# Patient Record
Sex: MALE | Race: WHITE | NOT HISPANIC OR LATINO | Employment: OTHER | URBAN - METROPOLITAN AREA
[De-identification: names, ages, dates, MRNs, and addresses within clinical notes are randomized per-mention and may not be internally consistent; named-entity substitution may affect disease eponyms.]

---

## 2017-10-02 ENCOUNTER — HISTORICAL (OUTPATIENT)
Dept: NUTRITION | Facility: HOSPITAL | Age: 62
End: 2017-10-02

## 2017-12-26 ENCOUNTER — HISTORICAL (OUTPATIENT)
Dept: ADMINISTRATIVE | Facility: HOSPITAL | Age: 62
End: 2017-12-26

## 2018-01-05 ENCOUNTER — HISTORICAL (OUTPATIENT)
Dept: SURGERY | Facility: HOSPITAL | Age: 63
End: 2018-01-05

## 2018-06-19 ENCOUNTER — HISTORICAL (OUTPATIENT)
Dept: LAB | Facility: HOSPITAL | Age: 63
End: 2018-06-19

## 2018-12-11 ENCOUNTER — HISTORICAL (OUTPATIENT)
Dept: LAB | Facility: HOSPITAL | Age: 63
End: 2018-12-11

## 2020-02-07 ENCOUNTER — HISTORICAL (OUTPATIENT)
Dept: RADIOLOGY | Facility: HOSPITAL | Age: 65
End: 2020-02-07

## 2020-03-23 ENCOUNTER — HISTORICAL (OUTPATIENT)
Dept: LAB | Facility: HOSPITAL | Age: 65
End: 2020-03-23

## 2020-10-07 LAB
BUN SERPL-MCNC: 15 MG/DL (ref 7–18)
CALCIUM SERPL-MCNC: 7.9 MG/DL (ref 8.5–10.1)
CREAT SERPL-MCNC: 0.88 MG/DL (ref 0.6–1.3)
GLUCOSE SERPL-MCNC: 100 MG/DL (ref 74–106)
POTASSIUM SERPL-SCNC: 3.5 MMOL/L (ref 3.5–5.1)
SODIUM SERPL-SCNC: 140 MEQ/L (ref 131–145)

## 2021-04-27 ENCOUNTER — HISTORICAL (OUTPATIENT)
Dept: LAB | Facility: HOSPITAL | Age: 66
End: 2021-04-27

## 2021-04-27 LAB
ABS NEUT (OLG): 7.9 X10(3)/MCL (ref 2.1–9.2)
ALBUMIN SERPL-MCNC: 3.9 GM/DL (ref 3.4–4.8)
ALBUMIN/GLOB SERPL: 1.3 RATIO (ref 1.1–2)
ALP SERPL-CCNC: 126 UNIT/L (ref 40–150)
ALT SERPL-CCNC: 31 UNIT/L (ref 0–55)
APPEARANCE, UA: CLEAR
AST SERPL-CCNC: 19 UNIT/L (ref 5–34)
BACTERIA SPEC CULT: ABNORMAL
BASOPHILS # BLD AUTO: 0 X10(3)/MCL (ref 0–0.2)
BASOPHILS NFR BLD AUTO: 0 %
BILIRUB SERPL-MCNC: 0.8 MG/DL
BILIRUB UR QL STRIP: NEGATIVE
BILIRUBIN DIRECT+TOT PNL SERPL-MCNC: 0.3 MG/DL (ref 0–0.5)
BILIRUBIN DIRECT+TOT PNL SERPL-MCNC: 0.5 MG/DL (ref 0–0.8)
BUN SERPL-MCNC: 19.8 MG/DL (ref 8.4–25.7)
CALCIUM SERPL-MCNC: 9 MG/DL (ref 8.8–10)
CHLORIDE SERPL-SCNC: 108 MMOL/L (ref 98–107)
CHOLEST SERPL-MCNC: 126 MG/DL
CHOLEST/HDLC SERPL: 4 {RATIO} (ref 0–5)
CO2 SERPL-SCNC: 24 MMOL/L (ref 23–31)
COLOR UR: YELLOW
CREAT SERPL-MCNC: 1.11 MG/DL (ref 0.73–1.18)
DEPRECATED CALCIDIOL+CALCIFEROL SERPL-MC: 29.3 NG/ML (ref 30–80)
EOSINOPHIL # BLD AUTO: 0.4 X10(3)/MCL (ref 0–0.9)
EOSINOPHIL NFR BLD AUTO: 4 %
ERYTHROCYTE [DISTWIDTH] IN BLOOD BY AUTOMATED COUNT: 13.1 % (ref 11.5–17)
EST. AVERAGE GLUCOSE BLD GHB EST-MCNC: 116.9 MG/DL
GLOBULIN SER-MCNC: 3 GM/DL (ref 2.4–3.5)
GLUCOSE (UA): NEGATIVE
GLUCOSE SERPL-MCNC: 106 MG/DL (ref 82–115)
HBA1C MFR BLD: 5.7 %
HCT VFR BLD AUTO: 46 % (ref 42–52)
HDLC SERPL-MCNC: 36 MG/DL (ref 35–60)
HGB BLD-MCNC: 14.6 GM/DL (ref 14–18)
HGB UR QL STRIP: ABNORMAL
IMM GRANULOCYTES # BLD AUTO: 0.02 % (ref 0–0.02)
IMM GRANULOCYTES NFR BLD AUTO: 0.2 % (ref 0–0.43)
KETONES UR QL STRIP: NEGATIVE
LDLC SERPL CALC-MCNC: 65 MG/DL (ref 50–140)
LEUKOCYTE ESTERASE UR QL STRIP: NEGATIVE
LYMPHOCYTES # BLD AUTO: 1.9 X10(3)/MCL (ref 0.6–4.6)
LYMPHOCYTES NFR BLD AUTO: 17 %
MCH RBC QN AUTO: 29.1 PG (ref 27–31)
MCHC RBC AUTO-ENTMCNC: 31.7 GM/DL (ref 33–36)
MCV RBC AUTO: 91.8 FL (ref 80–94)
MONOCYTES # BLD AUTO: 0.8 X10(3)/MCL (ref 0.1–1.3)
MONOCYTES NFR BLD AUTO: 7 %
NEUTROPHILS # BLD AUTO: 7.9 X10(3)/MCL (ref 1.4–7.9)
NEUTROPHILS NFR BLD AUTO: 71 %
NITRITE UR QL STRIP: NEGATIVE
PH UR STRIP: 7 [PH] (ref 5–9)
PLATELET # BLD AUTO: 269 X10(3)/MCL (ref 130–400)
PMV BLD AUTO: 10.9 FL (ref 9.4–12.4)
POTASSIUM SERPL-SCNC: 4.1 MMOL/L (ref 3.5–5.1)
PROT SERPL-MCNC: 6.9 GM/DL (ref 5.8–7.6)
PROT UR QL STRIP: 30
RBC # BLD AUTO: 5.01 X10(6)/MCL (ref 4.7–6.1)
RBC #/AREA URNS HPF: ABNORMAL /HPF
SODIUM SERPL-SCNC: 143 MMOL/L (ref 136–145)
SP GR UR STRIP: 1.02 (ref 1–1.03)
SQUAMOUS EPITHELIAL, UA: ABNORMAL
TRIGL SERPL-MCNC: 123 MG/DL (ref 34–140)
UROBILINOGEN UR STRIP-ACNC: 0.2
VLDLC SERPL CALC-MCNC: 25 MG/DL
WBC # SPEC AUTO: 11.1 X10(3)/MCL (ref 4.5–11.5)
WBC #/AREA URNS HPF: ABNORMAL /[HPF]

## 2021-07-17 ENCOUNTER — HISTORICAL (OUTPATIENT)
Dept: LAB | Facility: HOSPITAL | Age: 66
End: 2021-07-17

## 2021-07-17 LAB
ABS NEUT (OLG): 4.56 X10(3)/MCL (ref 2.1–9.2)
ALBUMIN SERPL-MCNC: 3.9 GM/DL (ref 3.4–4.8)
ALBUMIN/GLOB SERPL: 1.3 RATIO (ref 1.1–2)
ALP SERPL-CCNC: 136 UNIT/L (ref 40–150)
ALT SERPL-CCNC: 24 UNIT/L (ref 0–55)
APPEARANCE, UA: CLEAR
APTT PPP: 24.5 SECOND(S) (ref 24.5–34.9)
AST SERPL-CCNC: 20 UNIT/L (ref 5–34)
BACTERIA SPEC CULT: NORMAL
BASOPHILS # BLD AUTO: 0 X10(3)/MCL (ref 0–0.2)
BASOPHILS NFR BLD AUTO: 0 %
BILIRUB SERPL-MCNC: 1.4 MG/DL
BILIRUB UR QL STRIP: NEGATIVE
BILIRUBIN DIRECT+TOT PNL SERPL-MCNC: 0.4 MG/DL (ref 0–0.5)
BILIRUBIN DIRECT+TOT PNL SERPL-MCNC: 1 MG/DL (ref 0–0.8)
BUN SERPL-MCNC: 25.9 MG/DL (ref 8.4–25.7)
CALCIUM SERPL-MCNC: 9.1 MG/DL (ref 8.8–10)
CHLORIDE SERPL-SCNC: 106 MMOL/L (ref 98–107)
CO2 SERPL-SCNC: 25 MMOL/L (ref 23–31)
COLOR UR: YELLOW
CREAT SERPL-MCNC: 1.4 MG/DL (ref 0.73–1.18)
EOSINOPHIL # BLD AUTO: 0.3 X10(3)/MCL (ref 0–0.9)
EOSINOPHIL NFR BLD AUTO: 3 %
ERYTHROCYTE [DISTWIDTH] IN BLOOD BY AUTOMATED COUNT: 13 % (ref 11.5–17)
EST. AVERAGE GLUCOSE BLD GHB EST-MCNC: 131.2 MG/DL
GLOBULIN SER-MCNC: 3.1 GM/DL (ref 2.4–3.5)
GLUCOSE (UA): >=1000
GLUCOSE SERPL-MCNC: 130 MG/DL (ref 82–115)
HBA1C MFR BLD: 6.2 %
HCT VFR BLD AUTO: 45.7 % (ref 42–52)
HGB BLD-MCNC: 14.7 GM/DL (ref 14–18)
HGB UR QL STRIP: NORMAL
IMM GRANULOCYTES # BLD AUTO: 0.01 % (ref 0–0.02)
IMM GRANULOCYTES NFR BLD AUTO: 0.1 % (ref 0–0.43)
INR PPP: 0.95 (ref 2–3)
KETONES UR QL STRIP: NEGATIVE
LEUKOCYTE ESTERASE UR QL STRIP: NEGATIVE
LYMPHOCYTES # BLD AUTO: 2.2 X10(3)/MCL (ref 0.6–4.6)
LYMPHOCYTES NFR BLD AUTO: 28 %
MCH RBC QN AUTO: 29.3 PG (ref 27–31)
MCHC RBC AUTO-ENTMCNC: 32.2 GM/DL (ref 33–36)
MCV RBC AUTO: 91 FL (ref 80–94)
MONOCYTES # BLD AUTO: 0.7 X10(3)/MCL (ref 0.1–1.3)
MONOCYTES NFR BLD AUTO: 9 %
NEUTROPHILS # BLD AUTO: 4.56 X10(3)/MCL (ref 1.4–7.9)
NEUTROPHILS NFR BLD AUTO: 59 %
NITRITE UR QL STRIP: NEGATIVE
PH UR STRIP: 6 [PH] (ref 5–9)
PLATELET # BLD AUTO: 240 X10(3)/MCL (ref 130–400)
PMV BLD AUTO: 11.3 FL (ref 9.4–12.4)
POTASSIUM SERPL-SCNC: 4.1 MMOL/L (ref 3.5–5.1)
PROT SERPL-MCNC: 7 GM/DL (ref 5.8–7.6)
PROT UR QL STRIP: 30
PROTHROMBIN TIME: 10 SECOND(S) (ref 9.3–11.4)
RBC # BLD AUTO: 5.02 X10(6)/MCL (ref 4.7–6.1)
RBC #/AREA URNS HPF: NORMAL /[HPF]
SODIUM SERPL-SCNC: 139 MMOL/L (ref 136–145)
SP GR UR STRIP: 1.02 (ref 1–1.03)
SQUAMOUS EPITHELIAL, UA: NORMAL
UROBILINOGEN UR STRIP-ACNC: 0.2
WBC # SPEC AUTO: 7.8 X10(3)/MCL (ref 4.5–11.5)
WBC #/AREA URNS HPF: NORMAL /HPF

## 2021-08-03 ENCOUNTER — HISTORICAL (OUTPATIENT)
Dept: LAB | Facility: HOSPITAL | Age: 66
End: 2021-08-03

## 2021-08-03 LAB — SARS-COV-2 AG RESP QL IA.RAPID: NEGATIVE

## 2022-01-04 ENCOUNTER — HISTORICAL (OUTPATIENT)
Dept: LAB | Facility: HOSPITAL | Age: 67
End: 2022-01-04

## 2022-01-25 ENCOUNTER — HISTORICAL (OUTPATIENT)
Dept: LAB | Facility: HOSPITAL | Age: 67
End: 2022-01-25

## 2022-04-09 ENCOUNTER — HISTORICAL (OUTPATIENT)
Dept: ADMINISTRATIVE | Facility: HOSPITAL | Age: 67
End: 2022-04-09
Payer: MEDICARE

## 2022-04-26 VITALS
DIASTOLIC BLOOD PRESSURE: 68 MMHG | HEIGHT: 66 IN | OXYGEN SATURATION: 97 % | SYSTOLIC BLOOD PRESSURE: 150 MMHG | WEIGHT: 218.06 LBS | BODY MASS INDEX: 35.04 KG/M2

## 2022-05-02 NOTE — HISTORICAL [facility] CERNER
This is a historical note converted from Jess. Formatting and pictures may have been removed.  Please reference Jess for original formatting and attached multimedia. History of Present Illness  61yM with a few month hx of left sided throat pain and otalgia. He has no smoking or EtOH hx  Denies lumps and bumps  He does report facial pressure and nasal congestion  Reports occasional heartburn  He takes no medicine  ?   Also has a right nasolabial skin cancer that will be treated by his dermatologist (Dr Laura in [place])  ?   8/4/16: some improvement in throat pain. still with left otalgia - which is described more as pressure than pain. States he has had chronic pressure sensation and intermittent worsening in the areas of left cheek and forehead. This has been going on for years but has never been treated with antibiotics or steroids. He has monthly flare-ups of the pressure/pain. States his left nostril stays stopped up all the time. He sleeps with mouth open and makes his throat very dry. States his wife tells him he holds his breath during sleep.  ?   8/23/16: here for f/u after CT.  ?   9/20/16:? Breathing much better and very pleased with results.? Reports moderate mucoid nasal discharge.? Skin lesion path BCCa - margins uninvolved.  ?   9/27/16: Breathing better. Smelling better. Does note decreased hearing in the left ear.  ?   10/11/16: Here for results of audio. Hearing stable. States he has intermittent left sided tinnitus since last surgery. denies vertigo. 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 +sun exposure. had another skin cancer removed from chest yesterday  nasal lesion healing. patient still with persistent nasal discharge, using saline irrigations TID.  Requesting sleep study. +snoring; no apneic episodes. +daytime sleepiness;forgetfullness  ?   12/15/16: Pt here in f/u. Had titration study 2 days ago. states he was not able to tolerate bipap or cpap due to need for extreme high  pressure. there is no report in the system as of yet. He is not on flonase or any nasal sprays.  He feels he is breathiing better since the surgery.  He was told during the titration study that something may be blocking his throat  ?  8/22/17: 61 y/o M w/ h/o multiple BCC including R nasal sidewall BCC s/p excision and local advancement flap (and concurrent s/p septo/turbs) in the OR by us in 9/12/2016. Has also had evaluation for COREY (couldnt prev tolerate CPAP titration), however doing well tolerating nasal CPAP now.?Has been lost to f/u with ENT for 6 months, however has been seeing Dermatology.  - Has new BCC of nasal dorsum per patient and Dermatologist (described as 7 mm in [facility] note on 1/12/17, and every report since mentions a nasal?BCC,?however I believe its never been biopsied as there no pathology report in our EMR describing this second nasal BCC, and patient does not recall it being biopsied). Patient reports that sometime last year he had an ulcer there that eventually resolved after a matter of ?months (patient not sure). Denies pain or change in the lesion.  - Had pain and nasal obstruction for a few months after septo turbs but once that resolved he reports resolution of obstruction and is very satisfied with the results  ?  8/29/17: Here for f/u. Biopsy site on nose healing well. Has had some dysphagia to certain solids, not liquids, feels like food gets stuck a bit but then goes down. Has had some coughing and choking. Cont to take omeprazole daily.  ?  12/12/17: Referred from  clinic for nasal BCCa. Bx on 11/7/17. Lesion present for 1 yr, occasional bleeding, different than one we biopsied in 8/2017. Long h/o sun exposure and previous BCCa (one removed by ENT in 2016). Diabetes diagnosis 4 months ago, A1c 10->7 with diet/exercise  ?  1/5/18: Here today for excision of right nasal BCCa and right post auricular cyst. No changes in medical history.  Review of Systems  Constitutional -  Denies  Eye - Denies  HENT - See HPI  Respiratory - Denies  Cardiovascular - Denies  Gastrointestinal - Denies  Genitourinary - Denies  Heme/Lymph ?- Denies  Endocrine ?- Denies  Immunologic ?- Denies  Musculoskeletal ?- Denies  Integumentary ?- Denies  Neurologic ?- Denies  All Other ROS negative  Physical Exam  AOx3, NAD  PERRL, EOMI  CN II-XII intact  Many areas of scalp erythema concerning for AK (solar changes), no mass or discrete lesion concerning for cancer  B EACs wnl. B TMs intact without effusion or retraction. R post auricular 1 cm subcutaneous nodule c/w sebaceous cyst  R nasal sidewall?with slightly raised area from previous WLE/adj tissue transfer  2x2mm indented hypopigmented area on right nasal tip,?previous biopsy site  Also small?5x6 mm area of hypopigmented and raised skin with central depression nasal supratip, at recent biopsy site that was + for BCCA  Septum midline +ITH  Fair dentition  No oral cavity/oropharynx lesions, tonsils absent/wnl  Neck soft; no LAD  No increased WOB [1]  Assessment/Plan  Ordered:  ceFAZolin, 2 gm, form: Infusion, IV Piggyback, On Call, Infuse over: 1 hr, Order duration: 1 dose(s), first dose 01/05/18 5:04:00 CST, 28,059  61yoM h/o SNHL s/p septo/turbs/R nasal sidewall BCC excision with local advancement flap on 9/12/17, now w/ new supratip BCCa  - Discussed options of clinic vs OR resection as well as reconstruction options including healing by secondary intention, adj tissue transfer, skin graft and paramedian. Pt decided that he would prefer all in one treatment. Will plan for WLE with local flap recon today in OR  Discussed possibility of complication from discordance between frozen and permanent pathology results, pt acknowledged. Consent signed today.  - Pt also with R postauricular likely sebaceous cyst, will excise today  ?   Problem List/Past Medical History  Ongoing  Acute lower UTI (urinary tract infection)  Basal cell carcinoma of skin of nose  BPH -  Benign prostatic hypertrophy  Cataract  CPAP treatment  Hypertension  Insomnia  Obesity  COREY - Obstructive sleep apnea  Historical  Procedure/Surgical History  basil cell removal chest wall (07/2017)  Monitoring of Sleep, External Approach (12/13/2016)  Polysomnography; age 6 years or older, sleep staging with 4 or more additional parameters of sleep, with initiation of continuous positive airway pressure therapy or bilevel ventilation, attended by a technologist (12/13/2016)  Adjacent tissue transfer or rearrangement, forehead, cheeks, chin, mouth, neck, axillae, genitalia, hands and/or feet; defect 10 sq cm or less (09/12/2016)  Excision Lesion Nose (None) (09/12/2016)  Excision of Face Skin, External Approach, Diagnostic (09/12/2016)  Excision, malignant lesion including margins, face, ears, eyelids, nose, lips; excised diameter 2.1 to 3.0 cm (09/12/2016)  Repair Nasal Septum, Percutaneous Endoscopic Approach (09/12/2016)  Septoplasty (None) (09/12/2016)  Septoplasty or submucous resection, with or without cartilage scoring, contouring or replacement with graft (09/12/2016)  Submucous resection inferior turbinate, partial or complete, any method (09/12/2016)  Transfer Face Skin, External Approach (09/12/2016)  Turbinate Reduction (None) (09/12/2016)  elbow sx  neck and back sx  shoulder sx  Medications  Inpatient  Ancef, 2 gm= 100 mL, IV Piggyback, On Call  Dextrose 50% and Water (50 mL vial/syringe), 12.5 gm= 25 mL, IV, As Directed, PRN  insulin lispro 100 units/mL subcutaneous injection, 2-9 units, Subcutaneous, As Directed, PRN  IVF D5 Lactated Ringers LR Infusion 1,000 mL, 1000 mL, IV  Home  amLODIPine 10 mg oral tablet, 10 mg= 1 tab(s), Oral, Daily, 3 refills  Flomax 0.4 mg oral capsule, 0.8 mg= 2 cap(s), Oral, Daily, 5 refills  Flonase 50 mcg/inh nasal spray, 1 spray(s), Nasal, Daily, 10 refills  Glucometer, See Instructions  Glucometer Test Strips, See Instructions, 3 refills  hydroCHLOROthiazide-losartan  25 mg-100 mg oral tablet, 1 tab(s), Oral, Daily, 3 refills  Jardiance 10 mg oral tablet, 10 mg= 1 tab(s), Oral, Once,? ?Not taking  Lancets, See Instructions, 3 refills  metformin 500 mg oral tablet, See Instructions, 5 refills  metformin 500 mg oral tablet, 500 mg= 1 tab(s), Oral, BID  omeprazole 20 mg oral DR capsule, 20 mg= 1 cap(s), Oral, Daily, 6 refills  Prilosec 20 mg oral DR capsule, 20 mg= 1 cap(s), Oral, Daily, 5 refills  terazosin 5 mg oral capsule, 5 mg= 1 cap(s), Oral, Once a day (at bedtime), 6 refills  Ultracet 37.5 mg-325 mg oral tablet, 1 tab(s), Oral, BID, PRN  Allergies  No Known Allergies  Social History  Alcohol - 12/26/2017  Current, Beer, 1-2 times per month  Employment/School - 12/26/2017  Unemployed, Highest education level: Some college.  Home/Environment - 12/26/2017  Lives with Significant other. Living situation: Home/Independent.  Substance Abuse - 12/26/2017  Never  Tobacco - 12/26/2017  Never smoker  Family History  Alzheimers disease: Father.  Lab Results  Final Diagnosis?(Verified)  ?  1. ?Skin lesion, right posterior shoulder, Punch biopsy:  - Mild actinic keratosis with severe solar elastosis (sun damage).  ?  2. ?Skin lesion, right nasal, Punch biopsy:  - Basal cell carcinoma.  ?  Signature Line  *Electronically Signed*  ?? Karina Aaron MD  Verified: 11/10/17 15:20  [2]     [1]?ENT Clinic Note; Paul Mazariegos MD 12/12/2017 12:27 CST  [2]?ENT Clinic Note; Paul Mazariegos MD 12/12/2017 12:27 CST

## 2022-05-02 NOTE — HISTORICAL [facility] CERNER
This is a historical note converted from [employer]. Formatting and pictures may have been removed.  Please reference [employer] for original formatting and attached multimedia. Indication for Surgery  Right?nasal basal cell Ca  Right postauricular cyst  Preoperative Diagnosis  Right?nasal basal cell Ca  Right postauricular cyst  Postoperative Diagnosis  Right?nasal basal cell Ca  Right postauricular cyst  Operation  WLE of right nasal BCCa with bilobe flap reconstruction  Excision of right post auricular cyst  Surgeon(s)  Joao Clements MD  Assistant  Luzma Parsons MD  Anesthesia  GETA  Estimated Blood Loss  20 cc  Findings  Right nasal supratip BCCa  Right post auricular cyst  Specimen(s)  Right nasal supratip lesion - positive margins  Right nasal supratip lesion 12-3 margin - frozen - negative  Right nasal supratip lesion 3-6 margin - frozen - negative  Right nasal supratip lesion 6-9 margin - frozen - negative  Right nasal supratip lesion 9-12 margin - frozen - negative  Right nasal supratip lesion Deep margin - positive  Right nasal supratip lesion 6-9 margin #2 - frozen - negative  Right nasal supratip lesion Deep margin - permanent  Right nasal supratip lesion deep margin - frozen - negative  Right postauricular cyst - permanent  Complications  None  Technique  After informed consent was obtained and all questions were answered, patient was brought to the operating room and placed on the operating room table in supine position. The patient was intubated by the anesthesia team without difficulty. The bed was then turned 90 degrees. The nasal lesion margins were marked and a proposed incision for the right postauricular lesion was marked. 5 cc of 2% lidocaine with epi was used to anesthetize the areas. The patient had a corneal shield placed in the right eye and he was prepped with baby shampoo. Patient was then draped in the usual sterile fashion. The marked lesion was excised using a 15 blade?and sent for frozen  evaluation. The deep and peripheral margins were involved so additional quadrant margins as well as a deep margin was sent. While waiting for pathology, attention was turned to the right postauricular mass. A 15 blade was used to incise an approximately 1.5 cm incision. Curved iris scissors were used to dissect the cyst circumfrentially. The cyst cavity was entered and keratinaceous material was expressed. The remainder of the cyst was removed in its entirety. The wound was then irrigated and hemostasis was obtained with bipolar cautery. The wound was closed with 4.0 vicryl in the deep layer and the skin was closed with running 5.0 fast suture.  Pathology had returned and an additional deep margin was taken - the first sent for permanent, the second additional deep margin was sent for frozen and this was free of carcinoma.  The planned bilobe flap was drawn using the final defect which measured 8 mm in diameter. The 15 blade was used to make the skin incisions and curved iris scissors were used to undermine the tissue widely. Hemostasis was obtained using bipolar cautery. The flap was then rotated and tacted in place using 4.0 PDS suture. The triangle portion of the flap needed to be trimmed to fit and a standing triangle deformity was removed from the most inferior aspect. The skin was reapproximated using 6.0 nylon suture in simple interrupted fashion.  The patient was then extubated without difficulty and moved to recovery in stable condition. All counts were corrected x 2 at the end of the case and Dr. Smallwood was available for all key portions.  ?

## 2022-07-14 PROBLEM — L57.0 ACTINIC KERATOSIS: Status: ACTIVE | Noted: 2022-07-14

## 2022-07-14 PROBLEM — R14.0 ABDOMINAL BLOATING: Status: ACTIVE | Noted: 2022-07-14

## 2022-07-14 PROBLEM — G47.33 OBSTRUCTIVE SLEEP APNEA SYNDROME: Status: ACTIVE | Noted: 2022-07-14

## 2022-07-14 PROBLEM — R06.09 DYSPNEA ON EXERTION: Status: ACTIVE | Noted: 2022-07-14

## 2022-07-14 PROBLEM — E66.9 OBESITY: Status: ACTIVE | Noted: 2022-07-14

## 2022-07-14 PROBLEM — E11.9 DIABETES MELLITUS: Status: ACTIVE | Noted: 2022-07-14

## 2022-07-14 PROBLEM — R19.7 DIARRHEA: Status: ACTIVE | Noted: 2022-07-14

## 2022-07-14 PROBLEM — M54.12 CERVICAL RADICULOPATHY: Status: ACTIVE | Noted: 2022-07-14

## 2022-07-14 PROBLEM — I10 HYPERTENSION: Status: ACTIVE | Noted: 2022-07-14

## 2022-07-14 PROBLEM — C44.311 BASAL CELL CARCINOMA (BCC) OF SKIN OF NOSE: Status: ACTIVE | Noted: 2022-07-14

## 2022-07-14 PROBLEM — N40.0 BENIGN PROSTATIC HYPERPLASIA: Status: ACTIVE | Noted: 2022-07-14

## 2022-07-14 PROBLEM — Z00.00 MEDICARE ANNUAL WELLNESS VISIT, SUBSEQUENT: Status: ACTIVE | Noted: 2022-07-14

## 2022-07-14 PROBLEM — G47.00 INSOMNIA: Status: ACTIVE | Noted: 2022-07-14

## 2022-07-14 PROBLEM — M25.559 ARTHRALGIA OF HIP: Status: ACTIVE | Noted: 2022-07-14

## 2022-07-17 PROBLEM — J31.0 CHRONIC RHINITIS: Status: ACTIVE | Noted: 2022-07-17

## 2022-07-17 PROBLEM — E78.2 MIXED HYPERLIPIDEMIA: Status: ACTIVE | Noted: 2022-07-17

## 2022-07-17 PROBLEM — K21.9 GASTROESOPHAGEAL REFLUX DISEASE WITHOUT ESOPHAGITIS: Status: ACTIVE | Noted: 2022-07-17

## 2022-07-26 ENCOUNTER — LAB VISIT (OUTPATIENT)
Dept: LAB | Facility: HOSPITAL | Age: 67
End: 2022-07-26
Attending: FAMILY MEDICINE
Payer: MEDICARE

## 2022-07-26 DIAGNOSIS — I10 PRIMARY HYPERTENSION: ICD-10-CM

## 2022-07-26 DIAGNOSIS — E78.2 MIXED HYPERLIPIDEMIA: ICD-10-CM

## 2022-07-26 DIAGNOSIS — E53.8 VITAMIN B12 DEFICIENCY: ICD-10-CM

## 2022-07-26 DIAGNOSIS — E11.65 TYPE 2 DIABETES MELLITUS WITH HYPERGLYCEMIA, WITHOUT LONG-TERM CURRENT USE OF INSULIN: ICD-10-CM

## 2022-07-26 DIAGNOSIS — E55.9 VITAMIN D DEFICIENCY: ICD-10-CM

## 2022-07-26 LAB
ALBUMIN SERPL-MCNC: 3.6 GM/DL (ref 3.4–4.8)
ALBUMIN/GLOB SERPL: 1 RATIO (ref 1.1–2)
ALP SERPL-CCNC: 164 UNIT/L (ref 40–150)
ALT SERPL-CCNC: 39 UNIT/L (ref 0–55)
AST SERPL-CCNC: 23 UNIT/L (ref 5–34)
BASOPHILS # BLD AUTO: 0.05 X10(3)/MCL (ref 0–0.2)
BASOPHILS NFR BLD AUTO: 0.5 %
BILIRUBIN DIRECT+TOT PNL SERPL-MCNC: 0.8 MG/DL
BUN SERPL-MCNC: 21.6 MG/DL (ref 8.4–25.7)
CALCIUM SERPL-MCNC: 9.4 MG/DL (ref 8.8–10)
CHLORIDE SERPL-SCNC: 111 MMOL/L (ref 98–107)
CHOLEST SERPL-MCNC: 123 MG/DL
CHOLEST/HDLC SERPL: 3 {RATIO} (ref 0–5)
CO2 SERPL-SCNC: 20 MMOL/L (ref 23–31)
CREAT SERPL-MCNC: 1.56 MG/DL (ref 0.73–1.18)
DEPRECATED CALCIDIOL+CALCIFEROL SERPL-MC: 26.3 NG/ML (ref 30–80)
EOSINOPHIL # BLD AUTO: 0.31 X10(3)/MCL (ref 0–0.9)
EOSINOPHIL NFR BLD AUTO: 3.3 %
ERYTHROCYTE [DISTWIDTH] IN BLOOD BY AUTOMATED COUNT: 14.9 % (ref 11.5–17)
EST. AVERAGE GLUCOSE BLD GHB EST-MCNC: 148.5 MG/DL
GLOBULIN SER-MCNC: 3.6 GM/DL (ref 2.4–3.5)
GLUCOSE SERPL-MCNC: 151 MG/DL (ref 82–115)
HBA1C MFR BLD: 6.8 %
HCT VFR BLD AUTO: 47.8 % (ref 42–52)
HDLC SERPL-MCNC: 37 MG/DL (ref 35–60)
HGB BLD-MCNC: 14.8 GM/DL (ref 14–18)
IMM GRANULOCYTES # BLD AUTO: 0.01 X10(3)/MCL (ref 0–0.04)
IMM GRANULOCYTES NFR BLD AUTO: 0.1 %
LDLC SERPL CALC-MCNC: 57 MG/DL (ref 50–140)
LYMPHOCYTES # BLD AUTO: 2.23 X10(3)/MCL (ref 0.6–4.6)
LYMPHOCYTES NFR BLD AUTO: 23.8 %
MCH RBC QN AUTO: 27.5 PG (ref 27–31)
MCHC RBC AUTO-ENTMCNC: 31 MG/DL (ref 33–36)
MCV RBC AUTO: 88.7 FL (ref 80–94)
MONOCYTES # BLD AUTO: 0.75 X10(3)/MCL (ref 0.1–1.3)
MONOCYTES NFR BLD AUTO: 8 %
NEUTROPHILS # BLD AUTO: 6 X10(3)/MCL (ref 2.1–9.2)
NEUTROPHILS NFR BLD AUTO: 64.3 %
PLATELET # BLD AUTO: 249 X10(3)/MCL (ref 130–400)
PMV BLD AUTO: 11 FL (ref 7.4–10.4)
POTASSIUM SERPL-SCNC: 4 MMOL/L (ref 3.5–5.1)
PROT SERPL-MCNC: 7.2 GM/DL (ref 5.8–7.6)
RBC # BLD AUTO: 5.39 X10(6)/MCL (ref 4.7–6.1)
SODIUM SERPL-SCNC: 140 MMOL/L (ref 136–145)
TRIGL SERPL-MCNC: 144 MG/DL (ref 34–140)
VIT B12 SERPL-MCNC: 271 PG/ML (ref 213–816)
VLDLC SERPL CALC-MCNC: 29 MG/DL
WBC # SPEC AUTO: 9.4 X10(3)/MCL (ref 4.5–11.5)

## 2022-07-26 PROCEDURE — 83036 HEMOGLOBIN GLYCOSYLATED A1C: CPT

## 2022-07-26 PROCEDURE — 82306 VITAMIN D 25 HYDROXY: CPT

## 2022-07-26 PROCEDURE — 82607 VITAMIN B-12: CPT

## 2022-07-26 PROCEDURE — 80061 LIPID PANEL: CPT

## 2022-07-26 PROCEDURE — 85025 COMPLETE CBC W/AUTO DIFF WBC: CPT

## 2022-07-26 PROCEDURE — 36415 COLL VENOUS BLD VENIPUNCTURE: CPT

## 2022-07-26 PROCEDURE — 80053 COMPREHEN METABOLIC PANEL: CPT

## 2022-09-15 ENCOUNTER — HISTORICAL (OUTPATIENT)
Dept: ADMINISTRATIVE | Facility: HOSPITAL | Age: 67
End: 2022-09-15
Payer: MEDICARE

## 2022-10-17 PROBLEM — Z00.00 MEDICARE ANNUAL WELLNESS VISIT, SUBSEQUENT: Status: RESOLVED | Noted: 2022-07-14 | Resolved: 2022-10-17

## 2022-11-13 ENCOUNTER — HOSPITAL ENCOUNTER (EMERGENCY)
Facility: HOSPITAL | Age: 67
Discharge: ANOTHER HOSPITAL | End: 2022-11-14
Attending: SPECIALIST
Payer: MEDICARE

## 2022-11-13 DIAGNOSIS — K56.609 SMALL BOWEL OBSTRUCTION: Primary | ICD-10-CM

## 2022-11-13 DIAGNOSIS — R73.9 ELEVATED BLOOD SUGAR: ICD-10-CM

## 2022-11-13 DIAGNOSIS — R10.9 ABDOMINAL PAIN: ICD-10-CM

## 2022-11-13 DIAGNOSIS — R03.0 ELEVATED BLOOD PRESSURE READING: ICD-10-CM

## 2022-11-13 LAB
ALBUMIN SERPL-MCNC: 4.5 GM/DL (ref 3.4–4.8)
ALBUMIN/GLOB SERPL: 1.1 RATIO (ref 1.1–2)
ALP SERPL-CCNC: 177 UNIT/L (ref 40–150)
ALT SERPL-CCNC: 28 UNIT/L (ref 0–55)
APPEARANCE UR: CLEAR
AST SERPL-CCNC: 28 UNIT/L (ref 5–34)
BACTERIA #/AREA URNS AUTO: ABNORMAL /HPF
BASOPHILS # BLD AUTO: 0.01 X10(3)/MCL (ref 0–0.2)
BASOPHILS NFR BLD AUTO: 0 %
BILIRUB UR QL STRIP.AUTO: NEGATIVE MG/DL
BILIRUBIN DIRECT+TOT PNL SERPL-MCNC: 2 MG/DL
BUN SERPL-MCNC: 17.7 MG/DL (ref 8.4–25.7)
CALCIUM SERPL-MCNC: 10.3 MG/DL (ref 8.8–10)
CHLORIDE SERPL-SCNC: 99 MMOL/L (ref 98–107)
CO2 SERPL-SCNC: 24 MMOL/L (ref 23–31)
COLOR UR AUTO: YELLOW
CREAT SERPL-MCNC: 1.31 MG/DL (ref 0.73–1.18)
EOSINOPHIL # BLD AUTO: 0.01 X10(3)/MCL (ref 0–0.9)
EOSINOPHIL NFR BLD AUTO: 0 %
ERYTHROCYTE [DISTWIDTH] IN BLOOD BY AUTOMATED COUNT: 12.8 % (ref 11.5–17)
GFR SERPLBLD CREATININE-BSD FMLA CKD-EPI: 60 MLS/MIN/1.73/M2
GLOBULIN SER-MCNC: 4 GM/DL (ref 2.4–3.5)
GLUCOSE SERPL-MCNC: 307 MG/DL (ref 82–115)
GLUCOSE UR QL STRIP.AUTO: >=1000 MG/DL
HCT VFR BLD AUTO: 54.6 % (ref 42–52)
HGB BLD-MCNC: 17.3 GM/DL (ref 14–18)
IMM GRANULOCYTES # BLD AUTO: 0.04 X10(3)/MCL (ref 0–0.04)
IMM GRANULOCYTES NFR BLD AUTO: 0.2 %
KETONES UR QL STRIP.AUTO: 80 MG/DL
LEUKOCYTE ESTERASE UR QL STRIP.AUTO: NEGATIVE UNIT/L
LIPASE SERPL-CCNC: 16 U/L
LYMPHOCYTES # BLD AUTO: 0.83 X10(3)/MCL (ref 0.6–4.6)
LYMPHOCYTES NFR BLD AUTO: 4.1 %
MCH RBC QN AUTO: 28 PG (ref 27–31)
MCHC RBC AUTO-ENTMCNC: 31.7 MG/DL (ref 33–36)
MCV RBC AUTO: 88.3 FL (ref 80–94)
MONOCYTES # BLD AUTO: 0.56 X10(3)/MCL (ref 0.1–1.3)
MONOCYTES NFR BLD AUTO: 2.8 %
NEUTROPHILS # BLD AUTO: 18.6 X10(3)/MCL (ref 2.1–9.2)
NEUTROPHILS NFR BLD AUTO: 92.9 %
NITRITE UR QL STRIP.AUTO: NEGATIVE
PH UR STRIP.AUTO: 7 [PH]
PLATELET # BLD AUTO: 237 X10(3)/MCL (ref 130–400)
PMV BLD AUTO: 12.1 FL (ref 7.4–10.4)
POTASSIUM SERPL-SCNC: 3.7 MMOL/L (ref 3.5–5.1)
PROT SERPL-MCNC: 8.5 GM/DL (ref 5.8–7.6)
PROT UR QL STRIP.AUTO: >=300 MG/DL
RBC # BLD AUTO: 6.18 X10(6)/MCL (ref 4.7–6.1)
RBC #/AREA URNS AUTO: ABNORMAL /HPF
RBC UR QL AUTO: ABNORMAL UNIT/L
SODIUM SERPL-SCNC: 140 MMOL/L (ref 136–145)
SP GR UR STRIP.AUTO: 1.02
SQUAMOUS #/AREA URNS AUTO: ABNORMAL /HPF
UROBILINOGEN UR STRIP-ACNC: 1 MG/DL
WBC # SPEC AUTO: 20.1 X10(3)/MCL (ref 4.5–11.5)
WBC #/AREA URNS AUTO: ABNORMAL /HPF

## 2022-11-13 PROCEDURE — 96376 TX/PRO/DX INJ SAME DRUG ADON: CPT

## 2022-11-13 PROCEDURE — 80053 COMPREHEN METABOLIC PANEL: CPT | Performed by: SPECIALIST

## 2022-11-13 PROCEDURE — 63600175 PHARM REV CODE 636 W HCPCS: Performed by: SPECIALIST

## 2022-11-13 PROCEDURE — 96375 TX/PRO/DX INJ NEW DRUG ADDON: CPT

## 2022-11-13 PROCEDURE — 82962 GLUCOSE BLOOD TEST: CPT

## 2022-11-13 PROCEDURE — 96374 THER/PROPH/DIAG INJ IV PUSH: CPT | Mod: 59

## 2022-11-13 PROCEDURE — 85025 COMPLETE CBC W/AUTO DIFF WBC: CPT | Performed by: SPECIALIST

## 2022-11-13 PROCEDURE — 96361 HYDRATE IV INFUSION ADD-ON: CPT

## 2022-11-13 PROCEDURE — 25000003 PHARM REV CODE 250: Performed by: SPECIALIST

## 2022-11-13 PROCEDURE — 81001 URINALYSIS AUTO W/SCOPE: CPT | Performed by: SPECIALIST

## 2022-11-13 PROCEDURE — 81003 URINALYSIS AUTO W/O SCOPE: CPT | Performed by: SPECIALIST

## 2022-11-13 PROCEDURE — 83690 ASSAY OF LIPASE: CPT | Performed by: SPECIALIST

## 2022-11-13 PROCEDURE — 99285 EMERGENCY DEPT VISIT HI MDM: CPT | Mod: 25

## 2022-11-13 RX ORDER — MORPHINE SULFATE 4 MG/ML
4 INJECTION, SOLUTION INTRAMUSCULAR; INTRAVENOUS
Status: COMPLETED | OUTPATIENT
Start: 2022-11-13 | End: 2022-11-13

## 2022-11-13 RX ORDER — ONDANSETRON 2 MG/ML
4 INJECTION INTRAMUSCULAR; INTRAVENOUS
Status: COMPLETED | OUTPATIENT
Start: 2022-11-13 | End: 2022-11-13

## 2022-11-13 RX ADMIN — MORPHINE SULFATE 4 MG: 4 INJECTION, SOLUTION INTRAMUSCULAR; INTRAVENOUS at 10:11

## 2022-11-13 RX ADMIN — SODIUM CHLORIDE 500 ML: 9 INJECTION, SOLUTION INTRAVENOUS at 10:11

## 2022-11-13 RX ADMIN — IOPAMIDOL 100 ML: 755 INJECTION, SOLUTION INTRAVENOUS at 11:11

## 2022-11-13 RX ADMIN — ONDANSETRON 4 MG: 2 INJECTION INTRAMUSCULAR; INTRAVENOUS at 10:11

## 2022-11-14 ENCOUNTER — ANESTHESIA EVENT (OUTPATIENT)
Dept: MEDSURG UNIT | Facility: HOSPITAL | Age: 67
End: 2022-11-14

## 2022-11-14 ENCOUNTER — ANESTHESIA (OUTPATIENT)
Dept: MEDSURG UNIT | Facility: HOSPITAL | Age: 67
End: 2022-11-14

## 2022-11-14 ENCOUNTER — HOSPITAL ENCOUNTER (INPATIENT)
Facility: HOSPITAL | Age: 67
LOS: 2 days | Discharge: HOME | DRG: 390 | End: 2022-11-16
Attending: INTERNAL MEDICINE | Admitting: INTERNAL MEDICINE
Payer: MEDICARE

## 2022-11-14 VITALS
OXYGEN SATURATION: 96 % | RESPIRATION RATE: 20 BRPM | HEART RATE: 93 BPM | HEIGHT: 65 IN | DIASTOLIC BLOOD PRESSURE: 96 MMHG | BODY MASS INDEX: 33.32 KG/M2 | TEMPERATURE: 98 F | WEIGHT: 200 LBS | SYSTOLIC BLOOD PRESSURE: 172 MMHG

## 2022-11-14 DIAGNOSIS — R07.9 CHEST PAIN: ICD-10-CM

## 2022-11-14 DIAGNOSIS — R10.84 GENERALIZED ABDOMINAL PAIN: ICD-10-CM

## 2022-11-14 DIAGNOSIS — K56.609 SMALL BOWEL OBSTRUCTION: Primary | ICD-10-CM

## 2022-11-14 LAB
ALBUMIN SERPL-MCNC: 4 GM/DL (ref 3.4–4.8)
ALBUMIN/GLOB SERPL: 1.1 RATIO (ref 1.1–2)
ALP SERPL-CCNC: 158 UNIT/L (ref 40–150)
ALT SERPL-CCNC: 25 UNIT/L (ref 0–55)
AST SERPL-CCNC: 21 UNIT/L (ref 5–34)
BASOPHILS # BLD AUTO: 0.03 X10(3)/MCL (ref 0–0.2)
BASOPHILS NFR BLD AUTO: 0.2 %
BILIRUBIN DIRECT+TOT PNL SERPL-MCNC: 2.1 MG/DL
BUN SERPL-MCNC: 17 MG/DL (ref 8.4–25.7)
CALCIUM SERPL-MCNC: 9.1 MG/DL (ref 8.8–10)
CHLORIDE SERPL-SCNC: 101 MMOL/L (ref 98–107)
CO2 SERPL-SCNC: 26 MMOL/L (ref 23–31)
CREAT SERPL-MCNC: 1.23 MG/DL (ref 0.73–1.18)
EOSINOPHIL # BLD AUTO: 0 X10(3)/MCL (ref 0–0.9)
EOSINOPHIL NFR BLD AUTO: 0 %
ERYTHROCYTE [DISTWIDTH] IN BLOOD BY AUTOMATED COUNT: 13.2 % (ref 11.5–17)
EST. AVERAGE GLUCOSE BLD GHB EST-MCNC: 148.5 MG/DL
GFR SERPLBLD CREATININE-BSD FMLA CKD-EPI: >60 MLS/MIN/1.73/M2
GLOBULIN SER-MCNC: 3.6 GM/DL (ref 2.4–3.5)
GLUCOSE SERPL-MCNC: 292 MG/DL (ref 82–115)
HBA1C MFR BLD: 6.8 %
HCT VFR BLD AUTO: 49.2 % (ref 42–52)
HGB BLD-MCNC: 16 GM/DL (ref 14–18)
IMM GRANULOCYTES # BLD AUTO: 0.06 X10(3)/MCL (ref 0–0.04)
IMM GRANULOCYTES NFR BLD AUTO: 0.4 %
LACTATE SERPL-SCNC: 1.3 MMOL/L (ref 0.5–2.2)
LYMPHOCYTES # BLD AUTO: 0.7 X10(3)/MCL (ref 0.6–4.6)
LYMPHOCYTES NFR BLD AUTO: 4.3 %
MCH RBC QN AUTO: 28.6 PG (ref 27–31)
MCHC RBC AUTO-ENTMCNC: 32.5 MG/DL (ref 33–36)
MCV RBC AUTO: 88 FL (ref 80–94)
MONOCYTES # BLD AUTO: 0.71 X10(3)/MCL (ref 0.1–1.3)
MONOCYTES NFR BLD AUTO: 4.4 %
NEUTROPHILS # BLD AUTO: 14.7 X10(3)/MCL (ref 2.1–9.2)
NEUTROPHILS NFR BLD AUTO: 90.7 %
PLATELET # BLD AUTO: 267 X10(3)/MCL (ref 130–400)
PMV BLD AUTO: 11.7 FL (ref 7.4–10.4)
POCT GLUCOSE: 201 MG/DL (ref 70–110)
POCT GLUCOSE: 227 MG/DL (ref 70–110)
POCT GLUCOSE: 236 MG/DL (ref 70–110)
POTASSIUM SERPL-SCNC: 3.6 MMOL/L (ref 3.5–5.1)
PROT SERPL-MCNC: 7.6 GM/DL (ref 5.8–7.6)
RBC # BLD AUTO: 5.59 X10(6)/MCL (ref 4.7–6.1)
SODIUM SERPL-SCNC: 140 MMOL/L (ref 136–145)
WBC # SPEC AUTO: 16.2 X10(3)/MCL (ref 4.5–11.5)

## 2022-11-14 PROCEDURE — 85025 COMPLETE CBC W/AUTO DIFF WBC: CPT | Performed by: INTERNAL MEDICINE

## 2022-11-14 PROCEDURE — 63600175 PHARM REV CODE 636 W HCPCS: Performed by: SPECIALIST

## 2022-11-14 PROCEDURE — 63600175 PHARM REV CODE 636 W HCPCS: Performed by: INTERNAL MEDICINE

## 2022-11-14 PROCEDURE — 25000003 PHARM REV CODE 250: Performed by: SURGERY

## 2022-11-14 PROCEDURE — 36415 COLL VENOUS BLD VENIPUNCTURE: CPT | Performed by: SURGERY

## 2022-11-14 PROCEDURE — 99285 EMERGENCY DEPT VISIT HI MDM: CPT | Mod: 25

## 2022-11-14 PROCEDURE — 25000003 PHARM REV CODE 250: Performed by: INTERNAL MEDICINE

## 2022-11-14 PROCEDURE — 94761 N-INVAS EAR/PLS OXIMETRY MLT: CPT

## 2022-11-14 PROCEDURE — 80053 COMPREHEN METABOLIC PANEL: CPT | Performed by: INTERNAL MEDICINE

## 2022-11-14 PROCEDURE — 83036 HEMOGLOBIN GLYCOSYLATED A1C: CPT | Performed by: INTERNAL MEDICINE

## 2022-11-14 PROCEDURE — 11000001 HC ACUTE MED/SURG PRIVATE ROOM

## 2022-11-14 PROCEDURE — 83605 ASSAY OF LACTIC ACID: CPT | Performed by: SURGERY

## 2022-11-14 PROCEDURE — 25000003 PHARM REV CODE 250: Performed by: SPECIALIST

## 2022-11-14 PROCEDURE — C9113 INJ PANTOPRAZOLE SODIUM, VIA: HCPCS | Performed by: INTERNAL MEDICINE

## 2022-11-14 PROCEDURE — 25000003 PHARM REV CODE 250

## 2022-11-14 PROCEDURE — S0030 INJECTION, METRONIDAZOLE: HCPCS | Performed by: SURGERY

## 2022-11-14 PROCEDURE — 36415 COLL VENOUS BLD VENIPUNCTURE: CPT | Performed by: INTERNAL MEDICINE

## 2022-11-14 PROCEDURE — 25500020 PHARM REV CODE 255: Performed by: SPECIALIST

## 2022-11-14 RX ORDER — ONDANSETRON 2 MG/ML
4 INJECTION INTRAMUSCULAR; INTRAVENOUS EVERY 8 HOURS PRN
Status: DISCONTINUED | OUTPATIENT
Start: 2022-11-14 | End: 2022-11-15

## 2022-11-14 RX ORDER — SODIUM CHLORIDE 0.9 % (FLUSH) 0.9 %
10 SYRINGE (ML) INJECTION
Status: DISCONTINUED | OUTPATIENT
Start: 2022-11-14 | End: 2022-11-16 | Stop reason: HOSPADM

## 2022-11-14 RX ORDER — LABETALOL HYDROCHLORIDE 5 MG/ML
20 INJECTION, SOLUTION INTRAVENOUS EVERY 4 HOURS PRN
Status: DISCONTINUED | OUTPATIENT
Start: 2022-11-14 | End: 2022-11-16 | Stop reason: HOSPADM

## 2022-11-14 RX ORDER — PANTOPRAZOLE SODIUM 40 MG/10ML
40 INJECTION, POWDER, LYOPHILIZED, FOR SOLUTION INTRAVENOUS
Status: COMPLETED | OUTPATIENT
Start: 2022-11-14 | End: 2022-11-14

## 2022-11-14 RX ORDER — HYDROMORPHONE HYDROCHLORIDE 2 MG/ML
1 INJECTION, SOLUTION INTRAMUSCULAR; INTRAVENOUS; SUBCUTANEOUS EVERY 4 HOURS PRN
Status: DISCONTINUED | OUTPATIENT
Start: 2022-11-14 | End: 2022-11-16 | Stop reason: HOSPADM

## 2022-11-14 RX ORDER — ONDANSETRON 2 MG/ML
4 INJECTION INTRAMUSCULAR; INTRAVENOUS
Status: COMPLETED | OUTPATIENT
Start: 2022-11-14 | End: 2022-11-14

## 2022-11-14 RX ORDER — GLUCAGON 1 MG
1 KIT INJECTION
Status: DISCONTINUED | OUTPATIENT
Start: 2022-11-14 | End: 2022-11-16 | Stop reason: HOSPADM

## 2022-11-14 RX ORDER — SODIUM CHLORIDE 9 MG/ML
1000 INJECTION, SOLUTION INTRAVENOUS
Status: COMPLETED | OUTPATIENT
Start: 2022-11-14 | End: 2022-11-14

## 2022-11-14 RX ORDER — LIDOCAINE HYDROCHLORIDE 20 MG/ML
5 INJECTION, SOLUTION EPIDURAL; INFILTRATION; INTRACAUDAL; PERINEURAL
Status: DISCONTINUED | OUTPATIENT
Start: 2022-11-14 | End: 2022-11-14 | Stop reason: HOSPADM

## 2022-11-14 RX ORDER — LIDOCAINE HYDROCHLORIDE 10 MG/ML
INJECTION INFILTRATION; PERINEURAL
Status: DISCONTINUED
Start: 2022-11-14 | End: 2022-11-14 | Stop reason: HOSPADM

## 2022-11-14 RX ORDER — MUPIROCIN 20 MG/G
OINTMENT TOPICAL 2 TIMES DAILY
Status: DISCONTINUED | OUTPATIENT
Start: 2022-11-14 | End: 2022-11-16 | Stop reason: HOSPADM

## 2022-11-14 RX ORDER — SODIUM CHLORIDE 9 MG/ML
1000 INJECTION, SOLUTION INTRAVENOUS CONTINUOUS
Status: ACTIVE | OUTPATIENT
Start: 2022-11-14 | End: 2022-11-14

## 2022-11-14 RX ORDER — METRONIDAZOLE 500 MG/100ML
500 INJECTION, SOLUTION INTRAVENOUS
Status: DISCONTINUED | OUTPATIENT
Start: 2022-11-14 | End: 2022-11-16 | Stop reason: HOSPADM

## 2022-11-14 RX ORDER — MORPHINE SULFATE 4 MG/ML
4 INJECTION, SOLUTION INTRAMUSCULAR; INTRAVENOUS
Status: COMPLETED | OUTPATIENT
Start: 2022-11-14 | End: 2022-11-14

## 2022-11-14 RX ORDER — TALC
6 POWDER (GRAM) TOPICAL NIGHTLY PRN
Status: DISCONTINUED | OUTPATIENT
Start: 2022-11-14 | End: 2022-11-16 | Stop reason: HOSPADM

## 2022-11-14 RX ORDER — MORPHINE SULFATE 4 MG/ML
2 INJECTION, SOLUTION INTRAMUSCULAR; INTRAVENOUS EVERY 4 HOURS PRN
Status: DISCONTINUED | OUTPATIENT
Start: 2022-11-14 | End: 2022-11-16 | Stop reason: HOSPADM

## 2022-11-14 RX ORDER — PROCHLORPERAZINE EDISYLATE 5 MG/ML
5 INJECTION INTRAMUSCULAR; INTRAVENOUS EVERY 6 HOURS PRN
Status: DISCONTINUED | OUTPATIENT
Start: 2022-11-14 | End: 2022-11-16 | Stop reason: HOSPADM

## 2022-11-14 RX ADMIN — PANTOPRAZOLE SODIUM 40 MG: 40 INJECTION, POWDER, FOR SOLUTION INTRAVENOUS at 06:11

## 2022-11-14 RX ADMIN — MUPIROCIN 1 G: 20 OINTMENT TOPICAL at 09:11

## 2022-11-14 RX ADMIN — ONDANSETRON 4 MG: 2 INJECTION INTRAMUSCULAR; INTRAVENOUS at 12:11

## 2022-11-14 RX ADMIN — MUPIROCIN 1 G: 20 OINTMENT TOPICAL at 08:11

## 2022-11-14 RX ADMIN — PIPERACILLIN SODIUM AND TAZOBACTAM SODIUM 4.5 G: 4; .5 INJECTION, POWDER, LYOPHILIZED, FOR SOLUTION INTRAVENOUS at 01:11

## 2022-11-14 RX ADMIN — MORPHINE SULFATE 2 MG: 4 INJECTION, SOLUTION INTRAMUSCULAR; INTRAVENOUS at 06:11

## 2022-11-14 RX ADMIN — HYDROMORPHONE HYDROCHLORIDE 1 MG: 2 INJECTION, SOLUTION INTRAMUSCULAR; INTRAVENOUS; SUBCUTANEOUS at 09:11

## 2022-11-14 RX ADMIN — LABETALOL HYDROCHLORIDE 20 MG: 5 INJECTION INTRAVENOUS at 01:11

## 2022-11-14 RX ADMIN — METRONIDAZOLE 500 MG: 5 INJECTION, SOLUTION INTRAVENOUS at 09:11

## 2022-11-14 RX ADMIN — PIPERACILLIN AND TAZOBACTAM 4.5 G: 4; .5 INJECTION, POWDER, LYOPHILIZED, FOR SOLUTION INTRAVENOUS; PARENTERAL at 08:11

## 2022-11-14 RX ADMIN — MORPHINE SULFATE 2 MG: 4 INJECTION, SOLUTION INTRAMUSCULAR; INTRAVENOUS at 08:11

## 2022-11-14 RX ADMIN — SODIUM CHLORIDE 1000 ML: 9 INJECTION, SOLUTION INTRAVENOUS at 03:11

## 2022-11-14 RX ADMIN — METRONIDAZOLE 500 MG: 5 INJECTION, SOLUTION INTRAVENOUS at 05:11

## 2022-11-14 RX ADMIN — SODIUM CHLORIDE 1000 ML: 9 INJECTION, SOLUTION INTRAVENOUS at 12:11

## 2022-11-14 RX ADMIN — PIPERACILLIN AND TAZOBACTAM 4.5 G: 4; .5 INJECTION, POWDER, LYOPHILIZED, FOR SOLUTION INTRAVENOUS; PARENTERAL at 12:11

## 2022-11-14 RX ADMIN — LIDOCAINE HYDROCHLORIDE: 10 INJECTION, SOLUTION INFILTRATION; PERINEURAL at 01:11

## 2022-11-14 RX ADMIN — SODIUM CHLORIDE 1000 ML: 9 INJECTION, SOLUTION INTRAVENOUS at 06:11

## 2022-11-14 RX ADMIN — HYDROMORPHONE HYDROCHLORIDE 1 MG: 2 INJECTION, SOLUTION INTRAMUSCULAR; INTRAVENOUS; SUBCUTANEOUS at 10:11

## 2022-11-14 RX ADMIN — PIPERACILLIN AND TAZOBACTAM 4.5 G: 4; .5 INJECTION, POWDER, LYOPHILIZED, FOR SOLUTION INTRAVENOUS; PARENTERAL at 06:11

## 2022-11-14 RX ADMIN — MORPHINE SULFATE 4 MG: 4 INJECTION, SOLUTION INTRAMUSCULAR; INTRAVENOUS at 12:11

## 2022-11-14 NOTE — ANESTHESIA PREPROCEDURE EVALUATION
11/14/2022  Nilesh Shane is a 67 y.o., male.      Pre-op Assessment    I have reviewed the Patient Summary Reports.     I have reviewed the Nursing Notes. I have reviewed the NPO Status.   I have reviewed the Medications.     Review of Systems  Anesthesia Hx:  Denies Family Hx of Anesthesia complications.   Denies Personal Hx of Anesthesia complications.   Social:  Non-Smoker, No Alcohol Use    Hematology/Oncology:  Hematology Normal   Oncology Normal     EENT/Dental:EENT/Dental Normal   Cardiovascular:   Hypertension, well controlled    Pulmonary:   Shortness of breath Sleep Apnea    Renal/:  Renal/ Normal     Hepatic/GI:   GERD    Musculoskeletal:  Musculoskeletal Normal    Neurological:   Neuromuscular Disease,    Endocrine:   Diabetes, well controlled, type 2    Dermatological:  Skin Normal    Psych:  Psychiatric Normal           Physical Exam  General: Cooperative, Alert and Oriented    Airway:  Mallampati: II   Mouth Opening: Normal  TM Distance: Normal  Tongue: Normal  Neck ROM: Normal ROM    Dental:  Intact        Anesthesia Plan  Type of Anesthesia, risks & benefits discussed:    Anesthesia Type: Gen ETT  Intra-op Monitoring Plan: Standard ASA Monitors  Post Op Pain Control Plan: multimodal analgesia  Induction:  IV  Airway Plan: Direct  Informed Consent: Informed consent signed with the Patient and all parties understand the risks and agree with anesthesia plan.  All questions answered. Patient consented to blood products? Yes  ASA Score: 3    .

## 2022-11-14 NOTE — PLAN OF CARE
Pt is a new admit to floor. Pt complained of pain at this time. Med that were listed in the computer was reviewed with pt/spouse but they could not remember all the names of the meds. Spouse states that she will go get the medication or list from home later today. Hospitalist is made aware via telemed. Will continue to monitor

## 2022-11-14 NOTE — ED PROVIDER NOTES
Encounter Date: 11/14/2022       History     Chief Complaint   Patient presents with    Abdominal Pain     Transfer from [facility] with dx of SBO. 100mcg fentanyl given in route per ems     67-year-old white male presents via EMS to the emergency department with a small-bowel obstruction.  Patient presented to the ER at [facility] after having significant abdominal pain that progressively got worse.  Patient has a history of colon cancer and had a sigmoid colon removed then developed an anastomotic leak which led to a diverting ileostomy at which time he had it reversed in September of this year patient states he was doing well until approximately 4:00 p.m. on November 13th    Review of patient's allergies indicates:  No Known Allergies  Past Medical History:   Diagnosis Date    Diabetes mellitus     Hypertension      Past Surgical History:   Procedure Laterality Date    COLON SURGERY      ILEOSTOMY      ILEOSTOMY CLOSURE       No family history on file.  Social History     Tobacco Use    Smoking status: Never    Smokeless tobacco: Never   Substance Use Topics    Alcohol use: Not Currently    Drug use: Never     Review of Systems   Constitutional: Negative.  Negative for activity change, appetite change, chills, diaphoresis, fatigue, fever and unexpected weight change.   HENT: Negative.  Negative for congestion, dental problem, drooling, ear discharge, ear pain, facial swelling, hearing loss, mouth sores, nosebleeds, postnasal drip, rhinorrhea, sinus pressure, sinus pain, sneezing, sore throat, tinnitus, trouble swallowing and voice change.    Eyes: Negative.  Negative for photophobia, pain, discharge, redness, itching and visual disturbance.   Respiratory: Negative.  Negative for apnea, cough, choking, chest tightness, shortness of breath, wheezing and stridor.    Cardiovascular: Negative.  Negative for chest pain, palpitations and leg swelling.   Gastrointestinal:  Positive for abdominal  distention and abdominal pain. Negative for anal bleeding, blood in stool, constipation, diarrhea, nausea, rectal pain and vomiting.   Endocrine: Negative.  Negative for cold intolerance, heat intolerance, polydipsia, polyphagia and polyuria.   Genitourinary: Negative.  Negative for decreased urine volume, difficulty urinating, dysuria, enuresis, flank pain, frequency, genital sores, hematuria, penile discharge, penile pain, penile swelling, scrotal swelling, testicular pain and urgency.   Musculoskeletal: Negative.  Negative for arthralgias, back pain, gait problem, joint swelling, myalgias, neck pain and neck stiffness.   Skin: Negative.  Negative for color change, pallor, rash and wound.   Allergic/Immunologic: Negative.  Negative for environmental allergies, food allergies and immunocompromised state.   Neurological: Negative.  Negative for dizziness, tremors, seizures, syncope, facial asymmetry, speech difficulty, weakness, light-headedness, numbness and headaches.   Hematological: Negative.  Negative for adenopathy. Does not bruise/bleed easily.   Psychiatric/Behavioral: Negative.  Negative for agitation, behavioral problems, confusion, decreased concentration, dysphoric mood, hallucinations, self-injury, sleep disturbance and suicidal ideas. The patient is not nervous/anxious and is not hyperactive.    All other systems reviewed and are negative.    Physical Exam     Initial Vitals [11/14/22 0254]   BP Pulse Resp Temp SpO2   (!) 182/98 108 18 98.4 °F (36.9 °C) 95 %      MAP       --         Physical Exam    Nursing note and vitals reviewed.  Constitutional: He appears well-developed and well-nourished.   HENT:   Head: Normocephalic and atraumatic.   Nasogastric tube in place   Eyes: Conjunctivae and EOM are normal. Pupils are equal, round, and reactive to light.   Neck: Neck supple.   Normal range of motion.  Cardiovascular:  Normal rate and regular rhythm.           Pulmonary/Chest: Breath sounds normal.    Abdominal: He exhibits distension. There is abdominal tenderness.   Diffusely distended abdomen, diffusely tender.  There is no rebound.  I do not appreciate any bowel sounds   Musculoskeletal:         General: Normal range of motion.      Cervical back: Normal range of motion and neck supple.     Neurological: He is alert and oriented to person, place, and time.   Skin: Skin is warm and dry. Capillary refill takes less than 2 seconds.   Psychiatric: He has a normal mood and affect. His behavior is normal. Judgment and thought content normal.       ED Course   Procedures  Admission on 11/13/2022, Discharged on 11/14/2022   Component Date Value Ref Range Status    Sodium Level 11/13/2022 140  136 - 145 mmol/L Final    Potassium Level 11/13/2022 3.7  3.5 - 5.1 mmol/L Final    Chloride 11/13/2022 99  98 - 107 mmol/L Final    Carbon Dioxide 11/13/2022 24  23 - 31 mmol/L Final    Glucose Level 11/13/2022 307 (H)  82 - 115 mg/dL Final    Blood Urea Nitrogen 11/13/2022 17.7  8.4 - 25.7 mg/dL Final    Creatinine 11/13/2022 1.31 (H)  0.73 - 1.18 mg/dL Final    Calcium Level Total 11/13/2022 10.3 (H)  8.8 - 10.0 mg/dL Final    Protein Total 11/13/2022 8.5 (H)  5.8 - 7.6 gm/dL Final    Albumin Level 11/13/2022 4.5  3.4 - 4.8 gm/dL Final    Globulin 11/13/2022 4.0 (H)  2.4 - 3.5 gm/dL Final    Albumin/Globulin Ratio 11/13/2022 1.1  1.1 - 2.0 ratio Final    Bilirubin Total 11/13/2022 2.0 (H)  <=1.5 mg/dL Final    Alkaline Phosphatase 11/13/2022 177 (H)  40 - 150 unit/L Final    Alanine Aminotransferase 11/13/2022 28  0 - 55 unit/L Final    Aspartate Aminotransferase 11/13/2022 28  5 - 34 unit/L Final    eGFR 11/13/2022 60  mls/min/1.73/m2 Final    Lipase Level 11/13/2022 16  <=60 U/L Final    Color, UA 11/13/2022 Yellow  Yellow, Light-Yellow, Dark Yellow, Qian, Straw Final    Appearance, UA 11/13/2022 Clear  Clear Final    Specific Gravity, UA 11/13/2022 1.025   Final    pH, UA 11/13/2022 7.0  5.0 - 8.5 Final    Protein, UA  11/13/2022 >=300 (A)  Negative mg/dL Final    Glucose, UA 11/13/2022 >=1000 (A)  Negative, Normal mg/dL Final    Ketones, UA 11/13/2022 80 (A)  Negative mg/dL Final    Blood, UA 11/13/2022 Trace-Lysed (A)  Negative unit/L Final    Bilirubin, UA 11/13/2022 Negative  Negative mg/dL Final    Urobilinogen, UA 11/13/2022 1.0  0.2, 1.0, Normal mg/dL Final    Nitrites, UA 11/13/2022 Negative  Negative Final    Leukocyte Esterase, UA 11/13/2022 Negative  Negative unit/L Final    WBC 11/13/2022 20.1 (H)  4.5 - 11.5 x10(3)/mcL Final    RBC 11/13/2022 6.18 (H)  4.70 - 6.10 x10(6)/mcL Final    Hgb 11/13/2022 17.3  14.0 - 18.0 gm/dL Final    Hct 11/13/2022 54.6 (H)  42.0 - 52.0 % Final    MCV 11/13/2022 88.3  80.0 - 94.0 fL Final    MCH 11/13/2022 28.0  27.0 - 31.0 pg Final    MCHC 11/13/2022 31.7 (L)  33.0 - 36.0 mg/dL Final    RDW 11/13/2022 12.8  11.5 - 17.0 % Final    Platelet 11/13/2022 237  130 - 400 x10(3)/mcL Final    MPV 11/13/2022 12.1 (H)  7.4 - 10.4 fL Final    Neut % 11/13/2022 92.9  % Final    Lymph % 11/13/2022 4.1  % Final    Mono % 11/13/2022 2.8  % Final    Eos % 11/13/2022 0.0  % Final    Basophil % 11/13/2022 0.0  % Final    Lymph # 11/13/2022 0.83  0.6 - 4.6 x10(3)/mcL Final    Neut # 11/13/2022 18.6 (H)  2.1 - 9.2 x10(3)/mcL Final    Mono # 11/13/2022 0.56  0.1 - 1.3 x10(3)/mcL Final    Eos # 11/13/2022 0.01  0 - 0.9 x10(3)/mcL Final    Baso # 11/13/2022 0.01  0 - 0.2 x10(3)/mcL Final    IG# 11/13/2022 0.04  0 - 0.04 x10(3)/mcL Final    IG% 11/13/2022 0.2  % Final    Bacteria, UA 11/13/2022 None Seen  None Seen, Rare, Occasional /HPF Final    RBC, UA 11/13/2022 3-5  None Seen, 0-2, 3-5, 0-5 /HPF Final    WBC, UA 11/13/2022 0-2  None Seen, 0-2, 3-5, 0-5 /HPF Final    Squamous Epithelial Cells, UA 11/13/2022 Few (A)  None Seen, Rare, Occasional, Occ /HPF Final    POCT Glucose 11/14/2022 236 (H)  70 - 110 mg/dL Final            Imaging Results    None       Technique: CT of the abdomen and pelvis was performed  with axial images as well as sagittal and coronal reconstruction images with intravenous contrast.     Comparison: None available.     Clinical History: Abdominal Pain (Pt into ED with complaints of abdominal pain that started around noon today. Pt having issues with constipation.Last normal BM was 2 days ago. Also episodes of nausea. Pt has hx of colon cancer with ileostomy that was reversed in Sept).     Dosage Information: Automated Exposure Control was utilized 1334.94 mGy.cm.     Findings:  Thorax:  Lungs: Streaky opacity is present at the visualized lung bases, consistent with nonspecific dependent changes and scarring and atelectasis.  Pleura: No effusions or thickening.  Heart: The heart size is within normal limits.  Liver: The liver appears unremarkable.  Biliary System: No intrahepatic or extrahepatic biliary duct dilatation is seen.  Gallbladder: The gallbladder appears unremarkable.  Pancreas: The pancreas appears unremarkable.  Spleen: The spleen appears unremarkable.  Adrenals: Both adrenal glands are thickened and may reflect adrenal hyperplasia.  Kidneys: The right kidney appears unremarkable with no stones cysts masses or hydronephrosis. The left kidney measures 9.2 cm in length with severe parenchymal atrophy. There is mild left hydronephrosis. There is a partially exophytic hypodense (HU 25) left renal cortical lesion in the upper pole which measures 2.8 x 2.6 x 2.3 cm (series 3, image 41 and series 601, image 48). This may reflect a complex cyst.  Aorta: There is mild calcification of the abdominal aorta and its branches.  IVC: Unremarkable.  Bowel: There are numerous mildly distended jejunal and ileal loops in the right mid and lower abdomen. There are small bowel anastomotic sutures in the right anterior mid abdomen (series 3, image 70 and series 601, images 21-22) which corresponds to the transitional site. There is feces like material within the distended small bowel just proximal to the  transitional site. There is extensive surrounding mesenteric fat stranding and trace interloop free fluid. The distal ileal loops are collapsed. There is a wide neck defect in the right ventral mid abdominal wall with herniation of mesenteric fat and multiple mildly distended small bowel loops. There is no transition at the hernia orifice. The hernia orifice measures 5.7 cm in transverse and 5 cm in craniocaudal dimension.  Esophagus: The visualized esophagus appears unremarkable.  Stomach: The stomach is moderately distended with fluid.  Duodenum: Unremarkable appearing duodenum.  Colon: Nondistended.  Appendix: The appendix appears unremarkable.  Peritoneum: No intraperitoneal free air or ascites is seen.     Pelvis: Calcific densities are seen in the pelvis, likely reflecting phleboliths.  Bladder: The bladder appears unremarkable.  Male:  Prostate gland: The prostate gland appears unremarkable. There are a few calcifications in the prostate gland.     Bony structures: Posterior spinal fusion devices are seen at L5 and S1.  Dorsal Spine: There is mild spondylosis of the visualized dorsal spine. Posterior spinal fusion devices are seen at L5 and S1.        Impression:  1. The left kidney measures 9.2 cm in length with severe parenchymal atrophy. There is mild left hydronephrosis. There is a partially exophytic hypodense (HU 25) left renal cortical lesion in the upper pole which measures 2.8 x 2.6 x 2.3 cm (series 3, image 41 and series 601, image 48). This may reflect a complex cyst.  2. There are numerous mildly distended jejunal and ileal loops in the right mid and lower abdomen. There are small bowel anastomotic sutures in the right anterior mid abdomen (series 3, image 70 and series 601, images 21-22) which corresponds to the transitional site. There is feces like material within the distended small bowel just proximal to the transitional site. There is extensive surrounding mesenteric fat stranding and trace  interloop free fluid. The distal ileal loops are collapsed. These findings suggest a high grade small bowel obstruction. Correlate clinically as regards further evaluation and followup. There is a wide neck defect in the right ventral mid abdominal wall with herniation of mesenteric fat and multiple mildly distended small bowel loops. There is no transition at the hernia orifice. The hernia orifice measures 5.7 cm in transverse and 5 cm in craniocaudal dimension.  3. Details and other findings as discussed above.        This result has not been signed. Information might be incomplete.           Exam Ended: 11/14/22 00:09 Last Resulted: 11/14/22 00:09                 Medications   0.9%  NaCl infusion (1,000 mLs Intravenous New Bag 11/14/22 4715)                              Clinical Impression:   Final diagnoses:  [K56.609] Small bowel obstruction (Primary)  [R10.84] Generalized abdominal pain      ED Disposition Condition    Admit Stable                José Luis Taylor MD  11/14/22 3710

## 2022-11-14 NOTE — ED PROVIDER NOTES
Encounter Date: 11/13/2022       History     Chief Complaint   Patient presents with    Abdominal Pain     Pt into ED with complaints of abdominal pain that started around noon today. Pt having issues with constipation.Last normal BM was 2 days ago. Also episodes of nausea. Pt has hx of colon cancer with ileostomy that was reversed in Sept     Patient presents with sudden abdominal pain which began around noon today or 8 hours prior to presentation; patient notes eating tomato soup and then began feeling pressure in his abdomen, bloating and had an episode of emesis; he presents with continued bloating and pressure in his abdomen; patient has a history of colon cancer and colectomy in March of this year followed by an anastomosis leak a week later requiring exploratory surgery and placement of an ileostomy; the ileostomy was taken down in September of this year and patient had been doing well until today;; no other abdominal surgeries; no fever, no diarrhea; patient has a history of hypertension, insulin-dependent diabetes mellitus, obesity, COREY, GERD with esophagitis    The history is provided by the patient.   Review of patient's allergies indicates:  No Known Allergies  No past medical history on file.  No past surgical history on file.  No family history on file.  Social History     Tobacco Use    Smoking status: Never    Smokeless tobacco: Never   Substance Use Topics    Alcohol use: Not Currently     Review of Systems   Constitutional: Negative.    HENT: Negative.     Respiratory: Negative.     Cardiovascular: Negative.    Gastrointestinal:         GERD   Genitourinary: Negative.    Musculoskeletal:  Positive for arthralgias.   Neurological: Negative.      Physical Exam     Initial Vitals [11/13/22 2014]   BP Pulse Resp Temp SpO2   (!) 184/88 99 20 97.5 °F (36.4 °C) 97 %      MAP       --         Physical Exam    Nursing note and vitals reviewed.  Constitutional: He appears well-developed and well-nourished.    HENT:   Head: Normocephalic and atraumatic.   Eyes: EOM are normal. Pupils are equal, round, and reactive to light.   Neck: Neck supple.   Normal range of motion.  Cardiovascular:  Normal rate, regular rhythm and normal heart sounds.           Pulmonary/Chest: Breath sounds normal.   Abdominal: Abdomen is soft. He exhibits distension. There is abdominal tenderness (Moderate, generalized).   Absent bowel sounds, tympanic abdomen to percussion   Musculoskeletal:         General: Normal range of motion.      Cervical back: Normal range of motion and neck supple.     Neurological: He is alert and oriented to person, place, and time.   Skin: Skin is warm and dry.       ED Course   Procedures  Labs Reviewed   COMPREHENSIVE METABOLIC PANEL - Abnormal; Notable for the following components:       Result Value    Glucose Level 307 (*)     Creatinine 1.31 (*)     Calcium Level Total 10.3 (*)     Protein Total 8.5 (*)     Globulin 4.0 (*)     Bilirubin Total 2.0 (*)     Alkaline Phosphatase 177 (*)     All other components within normal limits   URINALYSIS, REFLEX TO URINE CULTURE - Abnormal; Notable for the following components:    Protein, UA >=300 (*)     Glucose, UA >=1000 (*)     Ketones, UA 80 (*)     Blood, UA Trace-Lysed (*)     All other components within normal limits   CBC WITH DIFFERENTIAL - Abnormal; Notable for the following components:    WBC 20.1 (*)     RBC 6.18 (*)     Hct 54.6 (*)     MCHC 31.7 (*)     MPV 12.1 (*)     Neut # 18.6 (*)     All other components within normal limits   URINALYSIS, MICROSCOPIC - Abnormal; Notable for the following components:    Squamous Epithelial Cells, UA Few (*)     All other components within normal limits   POCT GLUCOSE - Abnormal; Notable for the following components:    POCT Glucose 236 (*)     All other components within normal limits   LIPASE - Normal   CBC W/ AUTO DIFFERENTIAL    Narrative:     The following orders were created for panel order CBC auto  differential.  Procedure                               Abnormality         Status                     ---------                               -----------         ------                     CBC with Differential[[id number]]        Abnormal            Final result                 Please view results for these tests on the individual orders.   POCT GLUCOSE MONITORING CONTINUOUS          Imaging Results              CT Abdomen Pelvis With Contrast (Preliminary result)  Result time 11/14/22 00:09:25      Preliminary result by Harry Schrader MD (11/14/22 00:09:25)                   Narrative:    START OF REPORT:  Technique: CT of the abdomen and pelvis was performed with axial images as well as sagittal and coronal reconstruction images with intravenous contrast.    Comparison: None available.    Clinical History: Abdominal Pain (Pt into ED with complaints of abdominal pain that started around noon today. Pt having issues with constipation.Last normal BM was 2 days ago. Also episodes of nausea. Pt has hx of colon cancer with ileostomy that was reversed in Sept).    Dosage Information: Automated Exposure Control was utilized 1334.94 mGy.cm.    Findings:  Thorax:  Lungs: Streaky opacity is present at the visualized lung bases, consistent with nonspecific dependent changes and scarring and atelectasis.  Pleura: No effusions or thickening.  Heart: The heart size is within normal limits.  Liver: The liver appears unremarkable.  Biliary System: No intrahepatic or extrahepatic biliary duct dilatation is seen.  Gallbladder: The gallbladder appears unremarkable.  Pancreas: The pancreas appears unremarkable.  Spleen: The spleen appears unremarkable.  Adrenals: Both adrenal glands are thickened and may reflect adrenal hyperplasia.  Kidneys: The right kidney appears unremarkable with no stones cysts masses or hydronephrosis. The left kidney measures 9.2 cm in length with severe parenchymal atrophy. There is mild left hydronephrosis.  There is a partially exophytic hypodense (HU 25) left renal cortical lesion in the upper pole which measures 2.8 x 2.6 x 2.3 cm (series 3, image 41 and series 601, image 48). This may reflect a complex cyst.  Aorta: There is mild calcification of the abdominal aorta and its branches.  IVC: Unremarkable.  Bowel: There are numerous mildly distended jejunal and ileal loops in the right mid and lower abdomen. There are small bowel anastomotic sutures in the right anterior mid abdomen (series 3, image 70 and series 601, images 21-22) which corresponds to the transitional site. There is feces like material within the distended small bowel just proximal to the transitional site. There is extensive surrounding mesenteric fat stranding and trace interloop free fluid. The distal ileal loops are collapsed. There is a wide neck defect in the right ventral mid abdominal wall with herniation of mesenteric fat and multiple mildly distended small bowel loops. There is no transition at the hernia orifice. The hernia orifice measures 5.7 cm in transverse and 5 cm in craniocaudal dimension.  Esophagus: The visualized esophagus appears unremarkable.  Stomach: The stomach is moderately distended with fluid.  Duodenum: Unremarkable appearing duodenum.  Colon: Nondistended.  Appendix: The appendix appears unremarkable.  Peritoneum: No intraperitoneal free air or ascites is seen.    Pelvis: Calcific densities are seen in the pelvis, likely reflecting phleboliths.  Bladder: The bladder appears unremarkable.  Male:  Prostate gland: The prostate gland appears unremarkable. There are a few calcifications in the prostate gland.    Bony structures: Posterior spinal fusion devices are seen at L5 and S1.  Dorsal Spine: There is mild spondylosis of the visualized dorsal spine. Posterior spinal fusion devices are seen at L5 and S1.      Impression:  1. The left kidney measures 9.2 cm in length with severe parenchymal atrophy. There is mild left  hydronephrosis. There is a partially exophytic hypodense (HU 25) left renal cortical lesion in the upper pole which measures 2.8 x 2.6 x 2.3 cm (series 3, image 41 and series 601, image 48). This may reflect a complex cyst.  2. There are numerous mildly distended jejunal and ileal loops in the right mid and lower abdomen. There are small bowel anastomotic sutures in the right anterior mid abdomen (series 3, image 70 and series 601, images 21-22) which corresponds to the transitional site. There is feces like material within the distended small bowel just proximal to the transitional site. There is extensive surrounding mesenteric fat stranding and trace interloop free fluid. The distal ileal loops are collapsed. These findings suggest a high grade small bowel obstruction. Correlate clinically as regards further evaluation and followup. There is a wide neck defect in the right ventral mid abdominal wall with herniation of mesenteric fat and multiple mildly distended small bowel loops. There is no transition at the hernia orifice. The hernia orifice measures 5.7 cm in transverse and 5 cm in craniocaudal dimension.  3. Details and other findings as discussed above.                          Preliminary result by Oferton Liveshopping, Rad Results In (11/14/22 00:09:25)                   Narrative:    START OF REPORT:  Technique: CT of the abdomen and pelvis was performed with axial images as well as sagittal and coronal reconstruction images with intravenous contrast.    Comparison: None available.    Clinical History: Abdominal Pain (Pt into ED with complaints of abdominal pain that started around noon today. Pt having issues with constipation.Last normal BM was 2 days ago. Also episodes of nausea. Pt has hx of colon cancer with ileostomy that was reversed in Sept).    Dosage Information: Automated Exposure Control was utilized 1334.94 mGy.cm.    Findings:  Thorax:  Lungs: Streaky opacity is present at the visualized lung bases,  consistent with nonspecific dependent changes and scarring and atelectasis.  Pleura: No effusions or thickening.  Heart: The heart size is within normal limits.  Liver: The liver appears unremarkable.  Biliary System: No intrahepatic or extrahepatic biliary duct dilatation is seen.  Gallbladder: The gallbladder appears unremarkable.  Pancreas: The pancreas appears unremarkable.  Spleen: The spleen appears unremarkable.  Adrenals: Both adrenal glands are thickened and may reflect adrenal hyperplasia.  Kidneys: The right kidney appears unremarkable with no stones cysts masses or hydronephrosis. The left kidney measures 9.2 cm in length with severe parenchymal atrophy. There is mild left hydronephrosis. There is a partially exophytic hypodense (HU 25) left renal cortical lesion in the upper pole which measures 2.8 x 2.6 x 2.3 cm (series 3, image 41 and series 601, image 48). This may reflect a complex cyst.  Aorta: There is mild calcification of the abdominal aorta and its branches.  IVC: Unremarkable.  Bowel: There are numerous mildly distended jejunal and ileal loops in the right mid and lower abdomen. There are small bowel anastomotic sutures in the right anterior mid abdomen (series 3, image 70 and series 601, images 21-22) which corresponds to the transitional site. There is feces like material within the distended small bowel just proximal to the transitional site. There is extensive surrounding mesenteric fat stranding and trace interloop free fluid. The distal ileal loops are collapsed. There is a wide neck defect in the right ventral mid abdominal wall with herniation of mesenteric fat and multiple mildly distended small bowel loops. There is no transition at the hernia orifice. The hernia orifice measures 5.7 cm in transverse and 5 cm in craniocaudal dimension.  Esophagus: The visualized esophagus appears unremarkable.  Stomach: The stomach is moderately distended with fluid.  Duodenum: Unremarkable appearing  duodenum.  Colon: Nondistended.  Appendix: The appendix appears unremarkable.  Peritoneum: No intraperitoneal free air or ascites is seen.    Pelvis: Calcific densities are seen in the pelvis, likely reflecting phleboliths.  Bladder: The bladder appears unremarkable.  Male:  Prostate gland: The prostate gland appears unremarkable. There are a few calcifications in the prostate gland.    Bony structures: Posterior spinal fusion devices are seen at L5 and S1.  Dorsal Spine: There is mild spondylosis of the visualized dorsal spine. Posterior spinal fusion devices are seen at L5 and S1.      Impression:  1. The left kidney measures 9.2 cm in length with severe parenchymal atrophy. There is mild left hydronephrosis. There is a partially exophytic hypodense (HU 25) left renal cortical lesion in the upper pole which measures 2.8 x 2.6 x 2.3 cm (series 3, image 41 and series 601, image 48). This may reflect a complex cyst.  2. There are numerous mildly distended jejunal and ileal loops in the right mid and lower abdomen. There are small bowel anastomotic sutures in the right anterior mid abdomen (series 3, image 70 and series 601, images 21-22) which corresponds to the transitional site. There is feces like material within the distended small bowel just proximal to the transitional site. There is extensive surrounding mesenteric fat stranding and trace interloop free fluid. The distal ileal loops are collapsed. These findings suggest a high grade small bowel obstruction. Correlate clinically as regards further evaluation and followup. There is a wide neck defect in the right ventral mid abdominal wall with herniation of mesenteric fat and multiple mildly distended small bowel loops. There is no transition at the hernia orifice. The hernia orifice measures 5.7 cm in transverse and 5 cm in craniocaudal dimension.  3. Details and other findings as discussed above.                                         X-Ray Abdomen AP 1 View  "(KUB) (Preliminary result)  Result time 11/13/22 22:37:55      ED Interpretation by Yoseph Sommer MD (11/13/22 22:37:55, [facility] - Emergency Dept, Emergency Medicine)    Nonspecific bowel gas pattern                                     Medications   sodium chloride 0.9% bolus 1,000 mL (has no administration in time range)   insulin regular injection 6 Units 0.06 mL (6 Units Intravenous Not Given 11/14/22 0015)   morphine injection 4 mg (has no administration in time range)   ondansetron injection 4 mg (has no administration in time range)   LIDOcaine (PF) 20 mg/ml (2%) injection 100 mg (has no administration in time range)   piperacillin-tazobactam (ZOSYN) 4.5 g in dextrose 5 % in water (D5W) 5 % 100 mL IVPB (MB+) (has no administration in time range)   LIDOcaine HCL 10 mg/ml (1%) 10 mg/mL (1 %) injection (has no administration in time range)   sodium chloride 0.9% bolus 500 mL (500 mLs Intravenous New Bag 11/13/22 2231)   ondansetron injection 4 mg (4 mg Intravenous Given 11/13/22 2231)   morphine injection 4 mg (4 mg Intravenous Given 11/13/22 2231)   iopamidoL (ISOVUE-370) injection 100 mL (100 mLs Intravenous Given 11/13/22 2318)                 ED Course as of 11/14/22 0148 Mon Nov 14, 2022   0052 Accepted at [facility]; discussed with Dr. Taylor [DD]   0110 Initially NG tube was obstructed with gastric contents but after flushing was able to remove approximately 200 cc yellow gastric contents [DD]      ED Course User Index  [DD] Yoseph Sommer MD          Patient Vitals for the past 24 hrs:   BP Temp Temp src Pulse Resp SpO2 Height Weight   11/14/22 0114 (!) 172/96 97.5 °F (36.4 °C) -- 93 20 -- -- --   11/13/22 2329 (!) 186/110 -- -- 85 -- 96 % -- --   11/13/22 2231 -- -- -- -- 18 -- -- --   11/13/22 2014 (!) 184/88 97.5 °F (36.4 °C) Oral 99 20 97 % 5' 5" (1.651 m) 90.7 kg (200 lb)   Current blood pressure 172/96, pain controlled             Clinical Impression:   Final diagnoses:  [R10.9] " Abdominal pain  [K56.609] Small bowel obstruction (Primary)  [R73.9] Elevated blood sugar  [R03.0] Elevated blood pressure reading        ED Disposition Condition    Transfer to Another Facility Stable                Yoseph Sommer MD  11/14/22 0113       Yoseph Sommer MD  11/14/22 0148

## 2022-11-14 NOTE — ED NOTES
Pt  transferred via stretcher per [facility] to [facility] for higher level of care. Report given to Sheng obregon. Pt report and transfer form and radiology images given to EMTs.

## 2022-11-14 NOTE — H&P
[facility] - Medical Surgical Unit  History & Physical    SUBJECTIVE:     Chief Complaint/Reason for Admission:   Abdominal pain and nausea    History of Present Illness:  Patient is a 67 y.o. male seen through tele med with a nurse bedside and informed consent seen by me from home in [place] at [facility] presented with c/o abd pain and nausea with h/o clon cancer had his sigmoid colon removed developed an anastomotic leak which further led to a diverting ileostomy at [facility] was transferred from [facility] as [facility] was on diversion was found to have a SBO with abd distention and admitted to acute care with surgery consult    PTA Medications   Medication Sig    amLODIPine (NORVASC) 10 MG tablet Take 1 tablet (10 mg total) by mouth once daily at 6am.    ascorbic acid, vitamin C, (VITAMIN C) 1000 MG tablet Take 1,000 mg by mouth once daily.    atorvastatin (LIPITOR) 10 MG tablet Take 10 mg by mouth once daily.    B-complex with vitamin C (Z-BEC OR EQUIV) tablet Take 1 tablet by mouth once daily.    cholecalciferol, vitamin D3, (VITAMIN D3) 25 mcg (1,000 unit) capsule Take 1,000 Units by mouth once daily.    empagliflozin (JARDIANCE) 25 mg tablet Take 1 tablet (25 mg total) by mouth once daily.    fluticasone propionate (FLONASE) 50 mcg/actuation nasal spray by Nasal route.    glipiZIDE (GLUCOTROL) 10 MG TR24 Take 1 tablet (10 mg total) by mouth once daily at 6am.    hydroCHLOROthiazide (HYDRODIURIL) 25 MG tablet Take 1 tablet by mouth once daily.    omeprazole (PRILOSEC) 20 MG capsule TAKE 1 CAPSULE EVERY DAY    pioglitazone (ACTOS) 30 MG tablet TAKE 1 TABLET EVERY DAY    valsartan (DIOVAN) 160 MG tablet Take 1 tablet (160 mg total) by mouth once daily.       Review of patient's allergies indicates:  No Known Allergies    Past Medical History:   Diagnosis Date    Diabetes mellitus     Hypertension      Past Surgical History:   Procedure Laterality Date    COLON SURGERY      ILEOSTOMY      ILEOSTOMY  CLOSURE       History reviewed. No pertinent family history.  Social History     Tobacco Use    Smoking status: Never    Smokeless tobacco: Never   Substance Use Topics    Alcohol use: Not Currently    Drug use: Never        Review of Systems:  Constitutional: no fever or chills  Eyes: no visual changes  ENT: no nasal congestion or sore throat  Respiratory: no cough or shortness of breath  Cardiovascular: no chest pain or palpitations  Gastrointestinal: no nausea or vomiting, no abdominal pain or change in bowel habits, positive for abdominal pain and nausea  Genitourinary: no hematuria or dysuria  Integument/Breast: no rash or pruritis, positive for skin color change and cyst back redness to abd ileostomy  Hematologic/Lymphatic: no easy bruising or lymphadenopathy  Allergy/Immunology: seasonal allergies  Musculoskeletal: no arthralgias or myalgias    OBJECTIVE:     Vital Signs (Most Recent):  Temp: 98.2 °F (36.8 °C) (11/14/22 0451)  Pulse: 74 (11/14/22 0451)  Resp: 18 (11/14/22 0400)  BP: (!) 167/83 (11/14/22 0451)  SpO2: (!) 93 % (11/14/22 0451)    Physical Exam:  General: well developed, well nourished, appears stated age  HENT: Head:normocephalic, atraumatic. Ears:bilateral TM's and external ear canals normal. Nose: Nares normal. Septum midline. Mucosa normal. No drainage or sinus tenderness., no discharge, no epistaxis. Throat: lips, mucosa, and tongue normal; teeth and gums normal and no throat erythema.  Eyes: conjunctivae/corneas clear. PERRL.   Neck: supple, symmetrical, trachea midline, no JVD and thyroid not enlarged, symmetric, no tenderness/mass/nodules  Lungs:  clear to auscultation bilaterally and normal respiratory effort  Cardiovascular: Heart: regular rate and rhythm, S1, S2 normal, no murmur, click, rub or gallop. Chest Wall: no tenderness. Extremities: no cyanosis or edema, or clubbing. Pulses: 2+ and symmetric.  Abdomen/Rectal: Abdomen: normal findings: ileostomy incision redness cyst back abd  distention and tenderness  . Rectal: normal tone, no masses or tenderness and not examined  Genitalia: deferred  Skin: cyst back redness ileostomy lesions abd  Musculoskeletal:no clubbing, cyanosis  Lymph Nodes: No cervical or supraclavicular adenopathy  Neurologic: Normal strength and tone. No focal numbness or weakness    Laboratory:  I have reviewed all pertinent lab results within the past 24 hours.  CBC:   Recent Labs   Lab 11/14/22  0510   WBC 16.2*   RBC 5.59   HGB 16.0   HCT 49.2      MCV 88.0   MCH 28.6   MCHC 32.5*     BMP:   Recent Labs   Lab 11/14/22  0510      K 3.6   CO2 26   BUN 17.0   CREATININE 1.23*   CALCIUM 9.1     CMP:   Recent Labs   Lab 11/14/22  0510   CALCIUM 9.1   ALBUMIN 4.0      K 3.6   CO2 26   BUN 17.0   CREATININE 1.23*   ALKPHOS 158*   ALT 25   AST 21   BILITOT 2.1*     LFTs:   Recent Labs   Lab 11/14/22  0510   ALT 25   AST 21   ALKPHOS 158*   BILITOT 2.1*   ALBUMIN 4.0     Coagulation:   Recent Labs   Lab 11/14/22  0510   LABPROT 7.6     Cardiac markers: No results for input(s): CKMB, CPKMB, TROPONINT, TROPONINI, MYOGLOBIN in the last 168 hours.  ABGs: No results for input(s): PH, PCO2, PO2, HCO3, POCSATURATED, BE in the last 168 hours.  Microbiology Results (last 7 days)       ** No results found for the last 168 hours. **          Specimen (24h ago, onward)      None          Recent Labs   Lab 11/13/22  2258   PROTEINUA >=300*   BACTERIA None Seen   LEUKOCYTESUR Negative   UROBILINOGEN 1.0       Diagnostic Results:  CT scan reviewed  ASSESSMENT/PLAN:     SBO with abd pain  Admit to acute care  NPO  Surgery consulted  Labs and ctscan reviewed  IV protonix and zosyn for leukocytosis  Ng tube to suction  Repeat kub   Old records reviewed  Dw him and family   Ileostomy incision care   DM / HTN  Stable will monitor  GI and DVT prophylaxis

## 2022-11-15 PROBLEM — R10.9 INTRACTABLE ABDOMINAL PAIN: Status: ACTIVE | Noted: 2022-11-15

## 2022-11-15 PROBLEM — E11.9 DIABETES MELLITUS: Chronic | Status: ACTIVE | Noted: 2022-07-14

## 2022-11-15 PROBLEM — K56.609 SMALL BOWEL OBSTRUCTION: Status: ACTIVE | Noted: 2022-11-15

## 2022-11-15 PROBLEM — R10.9 INTRACTABLE ABDOMINAL PAIN: Chronic | Status: ACTIVE | Noted: 2022-11-15

## 2022-11-15 PROBLEM — C18.9 COLON CANCER: Status: ACTIVE | Noted: 2022-11-15

## 2022-11-15 PROBLEM — C18.9 COLON CANCER: Chronic | Status: ACTIVE | Noted: 2022-11-15

## 2022-11-15 PROBLEM — G47.33 OBSTRUCTIVE SLEEP APNEA SYNDROME: Chronic | Status: ACTIVE | Noted: 2022-07-14

## 2022-11-15 PROBLEM — Z90.49 HISTORY OF COLON RESECTION: Chronic | Status: ACTIVE | Noted: 2022-11-15

## 2022-11-15 PROBLEM — K56.609 SMALL BOWEL OBSTRUCTION: Chronic | Status: ACTIVE | Noted: 2022-11-15

## 2022-11-15 LAB
ALBUMIN SERPL-MCNC: 3.3 GM/DL (ref 3.4–4.8)
ALBUMIN/GLOB SERPL: 1.1 RATIO (ref 1.1–2)
ALP SERPL-CCNC: 123 UNIT/L (ref 40–150)
ALT SERPL-CCNC: 18 UNIT/L (ref 0–55)
AST SERPL-CCNC: 16 UNIT/L (ref 5–34)
BASOPHILS # BLD AUTO: 0.06 X10(3)/MCL (ref 0–0.2)
BASOPHILS NFR BLD AUTO: 0.5 %
BILIRUBIN DIRECT+TOT PNL SERPL-MCNC: 2.1 MG/DL
BUN SERPL-MCNC: 19 MG/DL (ref 8.4–25.7)
CALCIUM SERPL-MCNC: 8.5 MG/DL (ref 8.8–10)
CHLORIDE SERPL-SCNC: 108 MMOL/L (ref 98–107)
CO2 SERPL-SCNC: 26 MMOL/L (ref 23–31)
CREAT SERPL-MCNC: 1.29 MG/DL (ref 0.73–1.18)
EOSINOPHIL # BLD AUTO: 0.15 X10(3)/MCL (ref 0–0.9)
EOSINOPHIL NFR BLD AUTO: 1.2 %
ERYTHROCYTE [DISTWIDTH] IN BLOOD BY AUTOMATED COUNT: 13.3 % (ref 11.5–17)
GFR SERPLBLD CREATININE-BSD FMLA CKD-EPI: >60 MLS/MIN/1.73/M2
GLOBULIN SER-MCNC: 2.9 GM/DL (ref 2.4–3.5)
GLUCOSE SERPL-MCNC: 197 MG/DL (ref 82–115)
HCT VFR BLD AUTO: 45.4 % (ref 42–52)
HGB BLD-MCNC: 14 GM/DL (ref 14–18)
IMM GRANULOCYTES # BLD AUTO: 0.04 X10(3)/MCL (ref 0–0.04)
IMM GRANULOCYTES NFR BLD AUTO: 0.3 %
LYMPHOCYTES # BLD AUTO: 1.75 X10(3)/MCL (ref 0.6–4.6)
LYMPHOCYTES NFR BLD AUTO: 13.5 %
MCH RBC QN AUTO: 28.3 PG (ref 27–31)
MCHC RBC AUTO-ENTMCNC: 30.8 MG/DL (ref 33–36)
MCV RBC AUTO: 91.9 FL (ref 80–94)
MONOCYTES # BLD AUTO: 1.28 X10(3)/MCL (ref 0.1–1.3)
MONOCYTES NFR BLD AUTO: 9.9 %
NEUTROPHILS # BLD AUTO: 9.7 X10(3)/MCL (ref 2.1–9.2)
NEUTROPHILS NFR BLD AUTO: 74.6 %
PLATELET # BLD AUTO: 193 X10(3)/MCL (ref 130–400)
PMV BLD AUTO: 12.5 FL (ref 7.4–10.4)
POCT GLUCOSE: 140 MG/DL (ref 70–110)
POCT GLUCOSE: 167 MG/DL (ref 70–110)
POCT GLUCOSE: 181 MG/DL (ref 70–110)
POCT GLUCOSE: 186 MG/DL (ref 70–110)
POCT GLUCOSE: 42 MG/DL (ref 70–110)
POTASSIUM SERPL-SCNC: 3.3 MMOL/L (ref 3.5–5.1)
PROT SERPL-MCNC: 6.2 GM/DL (ref 5.8–7.6)
RBC # BLD AUTO: 4.94 X10(6)/MCL (ref 4.7–6.1)
SODIUM SERPL-SCNC: 143 MMOL/L (ref 136–145)
WBC # SPEC AUTO: 13 X10(3)/MCL (ref 4.5–11.5)

## 2022-11-15 PROCEDURE — 94761 N-INVAS EAR/PLS OXIMETRY MLT: CPT

## 2022-11-15 PROCEDURE — 25000003 PHARM REV CODE 250: Performed by: SURGERY

## 2022-11-15 PROCEDURE — 85025 COMPLETE CBC W/AUTO DIFF WBC: CPT | Performed by: INTERNAL MEDICINE

## 2022-11-15 PROCEDURE — 25000003 PHARM REV CODE 250: Performed by: INTERNAL MEDICINE

## 2022-11-15 PROCEDURE — 63600175 PHARM REV CODE 636 W HCPCS: Performed by: EMERGENCY MEDICINE

## 2022-11-15 PROCEDURE — 80053 COMPREHEN METABOLIC PANEL: CPT | Performed by: INTERNAL MEDICINE

## 2022-11-15 PROCEDURE — 11000001 HC ACUTE MED/SURG PRIVATE ROOM

## 2022-11-15 PROCEDURE — 63600175 PHARM REV CODE 636 W HCPCS: Performed by: INTERNAL MEDICINE

## 2022-11-15 PROCEDURE — S0030 INJECTION, METRONIDAZOLE: HCPCS | Performed by: SURGERY

## 2022-11-15 PROCEDURE — 25000003 PHARM REV CODE 250: Performed by: EMERGENCY MEDICINE

## 2022-11-15 PROCEDURE — 36415 COLL VENOUS BLD VENIPUNCTURE: CPT | Performed by: INTERNAL MEDICINE

## 2022-11-15 RX ORDER — DIPHENHYDRAMINE HCL 25 MG
25 CAPSULE ORAL EVERY 6 HOURS PRN
Status: DISCONTINUED | OUTPATIENT
Start: 2022-11-15 | End: 2022-11-16 | Stop reason: HOSPADM

## 2022-11-15 RX ORDER — ONDANSETRON 2 MG/ML
4 INJECTION INTRAMUSCULAR; INTRAVENOUS EVERY 8 HOURS PRN
Status: DISCONTINUED | OUTPATIENT
Start: 2022-11-15 | End: 2022-11-16 | Stop reason: HOSPADM

## 2022-11-15 RX ORDER — SODIUM CHLORIDE 0.9 % (FLUSH) 0.9 %
10 SYRINGE (ML) INJECTION EVERY 12 HOURS PRN
Status: DISCONTINUED | OUTPATIENT
Start: 2022-11-15 | End: 2022-11-16 | Stop reason: HOSPADM

## 2022-11-15 RX ORDER — VALSARTAN 80 MG/1
160 TABLET ORAL DAILY
Status: DISCONTINUED | OUTPATIENT
Start: 2022-11-15 | End: 2022-11-16 | Stop reason: HOSPADM

## 2022-11-15 RX ORDER — INSULIN ASPART 100 [IU]/ML
1-10 INJECTION, SOLUTION INTRAVENOUS; SUBCUTANEOUS
Status: DISCONTINUED | OUTPATIENT
Start: 2022-11-15 | End: 2022-11-16 | Stop reason: HOSPADM

## 2022-11-15 RX ORDER — FLUTICASONE PROPIONATE 50 MCG
1 SPRAY, SUSPENSION (ML) NASAL DAILY
Status: DISCONTINUED | OUTPATIENT
Start: 2022-11-16 | End: 2022-11-16 | Stop reason: HOSPADM

## 2022-11-15 RX ORDER — ENOXAPARIN SODIUM 100 MG/ML
40 INJECTION SUBCUTANEOUS EVERY 24 HOURS
Status: DISCONTINUED | OUTPATIENT
Start: 2022-11-15 | End: 2022-11-16 | Stop reason: HOSPADM

## 2022-11-15 RX ORDER — NALOXONE HCL 0.4 MG/ML
0.02 VIAL (ML) INJECTION
Status: DISCONTINUED | OUTPATIENT
Start: 2022-11-15 | End: 2022-11-16 | Stop reason: HOSPADM

## 2022-11-15 RX ORDER — GLUCAGON 1 MG
1 KIT INJECTION
Status: DISCONTINUED | OUTPATIENT
Start: 2022-11-15 | End: 2022-11-16 | Stop reason: HOSPADM

## 2022-11-15 RX ADMIN — VALSARTAN 160 MG: 80 TABLET ORAL at 01:11

## 2022-11-15 RX ADMIN — METRONIDAZOLE 500 MG: 5 INJECTION, SOLUTION INTRAVENOUS at 12:11

## 2022-11-15 RX ADMIN — PIPERACILLIN AND TAZOBACTAM 4.5 G: 4; .5 INJECTION, POWDER, LYOPHILIZED, FOR SOLUTION INTRAVENOUS; PARENTERAL at 09:11

## 2022-11-15 RX ADMIN — PIPERACILLIN AND TAZOBACTAM 4.5 G: 4; .5 INJECTION, POWDER, LYOPHILIZED, FOR SOLUTION INTRAVENOUS; PARENTERAL at 01:11

## 2022-11-15 RX ADMIN — PIPERACILLIN AND TAZOBACTAM 4.5 G: 4; .5 INJECTION, POWDER, LYOPHILIZED, FOR SOLUTION INTRAVENOUS; PARENTERAL at 05:11

## 2022-11-15 RX ADMIN — MUPIROCIN 1 G: 20 OINTMENT TOPICAL at 09:11

## 2022-11-15 RX ADMIN — METRONIDAZOLE 500 MG: 5 INJECTION, SOLUTION INTRAVENOUS at 05:11

## 2022-11-15 RX ADMIN — METRONIDAZOLE 500 MG: 5 INJECTION, SOLUTION INTRAVENOUS at 08:11

## 2022-11-15 RX ADMIN — ENOXAPARIN SODIUM 40 MG: 40 INJECTION SUBCUTANEOUS at 05:11

## 2022-11-15 NOTE — HPI
Patient is a 67 y.o. male seen through tele med with a nurse bedside and informed consent seen by me from home in [place] at [facility] presented with c/o abd pain and nausea with h/o clon cancer had his sigmoid colon removed developed an anastomotic leak which further led to a diverting ileostomy at [facility] was transferred from [facility] as [facility] was on diversion was found to have a SBO with abd distention and admitted to acute care with surgery consult.   - Bry Lo MD

## 2022-11-15 NOTE — HOSPITAL COURSE
11/14  - feels better. No abd pain or nausea. No flatus/BM.    11/15/2022.    Patient doing much better.  Nasogastric tube was removed yesterday and patient tolerated some ice chips without any nausea vomiting and diarrhea.    He did have a bowel movement yesterday is experiencing flatus to note today.    On examination still pain tenderness on right middle abdomen but nothing on the left.  There are some minimal bowel sounds present.    Continue IV fluid .   Since patient had a bowel movement, positive flatus recommend to start some clear liquids.  Will follow up with surgery.    Laboratory work reviewed I do not see any new development.  Continue conservative management.  11/16/2022  Patient doing very well.  Tolerated p.o. without any problem.  No nausea, no vomiting, no diarrhea and had bowel movement.    On examination present bowel sounds and no pain.    Seen evaluated by surgery as well.  Agree to discharge patient home.    Continue clear liquid and advance as tolerated.  Recommend not regular diet for the next week.    Follow up with surgeon in about 1 week

## 2022-11-15 NOTE — PROGRESS NOTES
[facility]  Medical Surgical Unit  General Surgery  Progress Note    Subjective:     Interval History:  No acute events reported by nursing staff the patient overnight.  Patient did have return of bowel function with passage of flatus and multiple large bowel movements.  NG tube output has decreased significantly.  Patient has abdominal distention has improved.  He states that overall he feels much better and has return of appetite.    Post-Op Info:  * No surgery found *          Medications:  Continuous Infusions:  Scheduled Meds:   enoxaparin  40 mg Subcutaneous Daily    [START ON 11/16/2022] fluticasone propionate  1 spray Each Nostril Daily    metronidazole  500 mg Intravenous Q8H    mupirocin   Nasal BID    piperacillin-tazobactam (ZOSYN) IVPB  4.5 g Intravenous Q8H    valsartan  160 mg Oral Daily     PRN Meds:dextrose 10%, dextrose 10%, dextrose 50%, glucagon (human recombinant), glucagon (human recombinant), HYDROmorphone, insulin aspart U-100, labetaloL, melatonin, morphine, naloxone, ondansetron, prochlorperazine, sodium chloride 0.9%, sodium chloride 0.9%     Objective:     Vital Signs (Most Recent):  Temp: 98.7 °F (37.1 °C) (11/15/22 1200)  Pulse: 69 (11/15/22 1200)  Resp: 20 (11/15/22 1200)  BP: (!) 169/88 (11/15/22 1200)  SpO2: 96 % (11/15/22 1200)   Vital Signs (24h Range):  Temp:  [97.9 °F (36.6 °C)-98.7 °F (37.1 °C)] 98.7 °F (37.1 °C)  Pulse:  [65-75] 69  Resp:  [18-20] 20  SpO2:  [92 %-96 %] 96 %  BP: (152-177)/(67-96) 169/88       Intake/Output Summary (Last 24 hours) at 11/15/2022 1615  Last data filed at 11/15/2022 0547  Gross per 24 hour   Intake 100 ml   Output 1525 ml   Net -1425 ml       Physical Exam  Abdominal:      General: Bowel sounds are normal.      Palpations: Abdomen is soft. There is no shifting dullness, fluid wave, hepatomegaly or splenomegaly.      Tenderness: There is no abdominal tenderness.      Comments: Overall significantly improved clinical abdominal exam  today, stable hernia in the right lower quadrant, no peritoneal irritation with exam       Significant Labs:  CBC:   Recent Labs   Lab 11/15/22  0411   WBC 13.0*   RBC 4.94   HGB 14.0   HCT 45.4      MCV 91.9   MCH 28.3   MCHC 30.8*     CMP:   Recent Labs   Lab 11/15/22  0411   CALCIUM 8.5*   ALBUMIN 3.3*      K 3.3*   CO2 26   BUN 19.0   CREATININE 1.29*   ALKPHOS 123   ALT 18   AST 16   BILITOT 2.1*     Lactic Acid: No results for input(s): LACTATE in the last 48 hours.    Significant Diagnostics:  None    Assessment/Plan:     Active Diagnoses:    Diagnosis Date Noted POA    PRINCIPAL PROBLEM:  Small bowel obstruction [K56.609] 11/15/2022 Yes     Chronic    Intractable abdominal pain [R10.9] 11/15/2022 Yes     Chronic    Colon cancer [C18.9] 11/15/2022 Yes     Chronic    History of colon resection [Z90.49] 11/15/2022 Not Applicable     Chronic    Diabetes mellitus [E11.9] 07/14/2022 Yes     Chronic      Problems Resolved During this Admission:   DC NG tube for today and will consider advancement of liquid diet this afternoon  Encourage patient to ambulate as tolerated  Monitor for recurrence of abdominal pain discomfort      Mary Mendez MD  General Surgery  [facility] - Medical Surgical Unit

## 2022-11-15 NOTE — PROGRESS NOTES
"Inpatient Nutrition Evaluation    Admit Date: 11/14/2022   Total duration of encounter: 1 day    Nutrition Recommendation/Prescription     Rec'd continue CL diet and advance to goal diet: GI Soft, Diabetic when medically appropriate as tolerated.   Biweekly weight and labs.   Monitor diet advancement, intake, tolerance, weight, and labs.     RD following and available as needed.  Thank you.     Nutrition Assessment     Chart Review    Reason Seen: continuous nutrition monitoring    Diagnosis:  Small Bowel Obstruction.     Relevant Medical History:   Diabetes mellitus      Hypertension          Nutrition-Related Medications:   Lovenox; Insulin.     Nutrition-Related Labs:  11/15: WBC 13.0(H); K+ 3.3(L); Cl 108(H); Crea 1.29(H); (H); Ca+ 8.5(L); Alb 3.3(L).     Diet Order: Diet clear liquid  Oral Supplement Order: none  Appetite/Oral Intake: good/% of meals  Factors Affecting Nutritional Intake: clear liquid diet  Food/Baptism/Cultural Preferences: none reported  Food Allergies: none reported    Skin Integrity: other (see comments) (abd midline scar; right quad abd scar; lower back scar; cyst to upper back)  Wound(s):       Comments    11/15:  NGT removed yesterday.  CL started.  Nsg reports pt is tolerating CL, had a BM, and is passing flatus. No recent weight loss noted/reported.  Will continue to monitor during stay.     Anthropometrics    Height: 5' 5" (165.1 cm)    Last Weight: 99.8 kg (220 lb) (11/14/22 0451) Weight Method: Standard Scale  BMI (Calculated): 36.6  BMI Classification: obese grade II (BMI 35-39.9)        Ideal Body Weight (IBW), Male: 136 lb     % Ideal Body Weight, Male (lb): 161.76 %                          Usual Weight Provided By: EMR weight history    Wt Readings from Last 5 Encounters:   11/14/22 99.8 kg (220 lb)   11/13/22 90.7 kg (200 lb)   07/18/22 93.6 kg (206 lb 6.4 oz)   01/17/22 98.9 kg (218 lb 0.6 oz)     Weight Change(s) Since Admission:  Admit Weight: 99.8 kg (220 " mer) (11/14/22 8669)      Patient Education    Not applicable.    Monitoring & Evaluation     Dietitian will monitor food and beverage intake, energy intake, weight, electrolyte/renal panel, glucose/endocrine profile, and gastrointestinal profile.  Nutrition Risk/Follow-Up: low (follow-up in 5-7 days)  Patients assigned 'low nutrition risk' status do not qualify for a full nutritional assessment but will be monitored and re-evaluated in a 5-7 day time period. Please consult if re-evaluation needed sooner.

## 2022-11-15 NOTE — SUBJECTIVE & OBJECTIVE
Interval History:  Patient feels much better today.    Had a bowel movement yesterday.  No nausea and vomiting.  Nasogastric tube has been removed.  Review of Systems   Constitutional:  Negative for activity change, fatigue and fever.   HENT: Negative.     Eyes: Negative.    Respiratory: Negative.     Cardiovascular: Negative.    Gastrointestinal:  Positive for abdominal distention and abdominal pain. Negative for nausea and rectal pain.   Neurological: Negative.    Hematological: Negative.    All other systems reviewed and are negative.  Objective:     Vital Signs (Most Recent):  Temp: 98.2 °F (36.8 °C) (11/15/22 0933)  Pulse: 65 (11/15/22 0933)  Resp: 20 (11/14/22 2258)  BP: (!) 160/77 (11/15/22 0933)  SpO2: 95 % (11/15/22 0933)   Vital Signs (24h Range):  Temp:  [97.5 °F (36.4 °C)-98.2 °F (36.8 °C)] 98.2 °F (36.8 °C)  Pulse:  [65-83] 65  Resp:  [18-20] 20  SpO2:  [92 %-95 %] 95 %  BP: (152-177)/(67-96) 160/77     Weight: 99.8 kg (220 lb)  Body mass index is 36.61 kg/m².    Intake/Output Summary (Last 24 hours) at 11/15/2022 1217  Last data filed at 11/15/2022 0547  Gross per 24 hour   Intake 100 ml   Output 1525 ml   Net -1425 ml      Physical Exam  Vitals and nursing note reviewed.   Constitutional:       General: He is not in acute distress.     Appearance: Normal appearance. He is normal weight. He is ill-appearing. He is not toxic-appearing or diaphoretic.   HENT:      Head: Normocephalic and atraumatic.      Nose: Nose normal.      Mouth/Throat:      Mouth: Mucous membranes are moist.   Eyes:      Extraocular Movements: Extraocular movements intact.      Conjunctiva/sclera: Conjunctivae normal.      Pupils: Pupils are equal, round, and reactive to light.   Cardiovascular:      Rate and Rhythm: Normal rate and regular rhythm.   Pulmonary:      Effort: Pulmonary effort is normal.      Breath sounds: Normal breath sounds.   Abdominal:      General: There is distension.      Tenderness: There is abdominal  tenderness. There is guarding and rebound. There is no right CVA tenderness or left CVA tenderness.   Genitourinary:     Penis: Normal.    Musculoskeletal:         General: Normal range of motion.      Cervical back: Normal range of motion and neck supple.   Skin:     General: Skin is warm.      Capillary Refill: Capillary refill takes less than 2 seconds.   Neurological:      General: No focal deficit present.      Mental Status: He is alert and oriented to person, place, and time.   Psychiatric:         Mood and Affect: Mood normal.       Significant Labs: All pertinent labs within the past 24 hours have been reviewed.  Recent Lab Results  (Last 5 results in the past 24 hours)        11/15/22  1148   11/15/22  0550   11/15/22  0411   11/14/22  2032   11/14/22  1229        Albumin/Globulin Ratio     1.1           Albumin     3.3           Alkaline Phosphatase     123           ALT     18           AST     16           Baso #     0.06           Basophil %     0.5           BILIRUBIN TOTAL     2.1           BUN     19.0           Calcium     8.5           Chloride     108           CO2     26           Creatinine     1.29           eGFR     >60           Eos #     0.15           Eosinophil %     1.2           Globulin, Total     2.9           Glucose     197           Hematocrit     45.4           Hemoglobin     14.0           Immature Grans (Abs)     0.04           Immature Granulocytes     0.3           Lactate, Wagner         1.3       Lymph #     1.75           LYMPH %     13.5           MCH     28.3           MCHC     30.8           MCV     91.9           Mono #     1.28           Mono %     9.9           MPV     12.5           Neut #     9.7           Neut %     74.6           Platelets     193           POCT Glucose 167   181     201         Potassium     3.3           PROTEIN TOTAL     6.2           RBC     4.94           RDW     13.3           Sodium     143           WBC     13.0                                   Significant Imaging: I have reviewed all pertinent imaging results/findings within the past 24 hours.

## 2022-11-15 NOTE — CONSULTS
[facility]  Medical Surgical Unit  General Surgery  Consult Note    Consults  Subjective:     Chief Complaint/Reason for Admission:  Abdominal distention and pain    History of Present Illness:  67-year-old white male presenting to the emergency department with complaint of abdominal pain discomfort and CT imaging concerning for small bowel obstruction.  Patient had an extensive surgical history including a sigmoid colectomy with postoperative leak requiring diverting ileostomy and reversal.  On CT scan patient is noted to have an ostomy site hernia as well as anastomosis in the right lower quadrant at the small bowel which appears to be related to the obstruction.  NG tube was placed with moderate decompression.  Currently at this time Omega decreasing abdominal pain nausea and began passing flatus.  He currently shows no outward signs of sepsis and denies fevers night sweats and chills.  He currently denies progressive abdominal pain or pain with movement of the trunk.  His recent reversal of ileostomy site was performed approximately 2 months ago.      No current facility-administered medications on file prior to encounter.     Current Outpatient Medications on File Prior to Encounter   Medication Sig    amLODIPine (NORVASC) 10 MG tablet Take 1 tablet (10 mg total) by mouth once daily at 6am.    ascorbic acid, vitamin C, (VITAMIN C) 1000 MG tablet Take 1,000 mg by mouth once daily.    atorvastatin (LIPITOR) 10 MG tablet Take 10 mg by mouth once daily.    B-complex with vitamin C (Z-BEC OR EQUIV) tablet Take 1 tablet by mouth once daily.    cholecalciferol, vitamin D3, (VITAMIN D3) 25 mcg (1,000 unit) capsule Take 1,000 Units by mouth once daily.    empagliflozin (JARDIANCE) 25 mg tablet Take 1 tablet (25 mg total) by mouth once daily.    fluticasone propionate (FLONASE) 50 mcg/actuation nasal spray by Nasal route.    glipiZIDE (GLUCOTROL) 10 MG TR24 Take 1 tablet (10 mg total) by mouth once daily at  6am.    hydroCHLOROthiazide (HYDRODIURIL) 25 MG tablet Take 1 tablet by mouth once daily.    omeprazole (PRILOSEC) 20 MG capsule TAKE 1 CAPSULE EVERY DAY    pioglitazone (ACTOS) 30 MG tablet TAKE 1 TABLET EVERY DAY    valsartan (DIOVAN) 160 MG tablet Take 1 tablet (160 mg total) by mouth once daily.       Review of patient's allergies indicates:  No Known Allergies    Past Medical History:   Diagnosis Date    Diabetes mellitus     Hypertension      Past Surgical History:   Procedure Laterality Date    COLON SURGERY      ILEOSTOMY      ILEOSTOMY CLOSURE       Family History    None       Tobacco Use    Smoking status: Never    Smokeless tobacco: Never   Substance and Sexual Activity    Alcohol use: Not Currently    Drug use: Never    Sexual activity: Not on file     Review of Systems   Gastrointestinal:  Positive for abdominal distention, abdominal pain, nausea and vomiting. Negative for anal bleeding, blood in stool, constipation, diarrhea and rectal pain.   Objective:     Vital Signs (Most Recent):  Temp: 98.7 °F (37.1 °C) (11/15/22 1200)  Pulse: 69 (11/15/22 1200)  Resp: 20 (11/15/22 1200)  BP: (!) 169/88 (11/15/22 1200)  SpO2: 96 % (11/15/22 1200)   Vital Signs (24h Range):  Temp:  [97.9 °F (36.6 °C)-98.7 °F (37.1 °C)] 98.7 °F (37.1 °C)  Pulse:  [65-75] 69  Resp:  [18-20] 20  SpO2:  [92 %-96 %] 96 %  BP: (152-177)/(67-96) 169/88     Weight: 99.8 kg (220 lb)  Body mass index is 36.61 kg/m².      Intake/Output Summary (Last 24 hours) at 11/15/2022 1606  Last data filed at 11/15/2022 0547  Gross per 24 hour   Intake 100 ml   Output 1525 ml   Net -1425 ml       Physical Exam  Constitutional:       Appearance: Normal appearance.   Cardiovascular:      Rate and Rhythm: Normal rate and regular rhythm.   Abdominal:      General: There is distension.      Palpations: Abdomen is soft. There is no mass.      Tenderness: There is abdominal tenderness. There is no right CVA tenderness, left CVA tenderness, guarding or  rebound.      Hernia: A hernia is present.   Neurological:      Mental Status: He is alert.         Significant Diagnostics:  CT: I have reviewed all pertinent results/findings within the past 24 hours.  Concur with CT scan findings    Assessment/Plan:     Active Diagnoses:    Diagnosis Date Noted POA    PRINCIPAL PROBLEM:  Small bowel obstruction [K56.609] 11/15/2022 Yes     Chronic    Intractable abdominal pain [R10.9] 11/15/2022 Yes     Chronic    Colon cancer [C18.9] 11/15/2022 Yes     Chronic    History of colon resection [Z90.49] 11/15/2022 Not Applicable     Chronic    Diabetes mellitus [E11.9] 07/14/2022 Yes     Chronic      Problems Resolved During this Admission:   Plan to continue with NG tube decompression and monitoring for return of bowel function as well as abdominal sepsis.  I have discussed with the family and the patient consideration for abdominal exploration should the bowel function returned patient exhibit worsening signs of abdominal discomfort.  We will reassess bowel function on a daily basis.  Our intent is to avoid abdominal exploration at this time due to recent abdominal surgery and inflammatory process which could increase his morbidity postoperatively.  However we will move forward with surgery emergently as needed.    Thank you for your consult. I will follow-up with patient. Please contact us if you have any additional questions.    Mary Mendez MD  General Surgery  [facility] - Medical Surgical Unit

## 2022-11-15 NOTE — NURSING
Initiated request per patient on 11/14 for transfer to [facility] to Dr Lauri Almaraz, spoke with Prema at [facility] yesterday and again this morning and informed still no beds.  Updated wife and she said they are very pleased with the care here and prefer to stay at [facility]. No longer requesting to be transferred.  Hospitalist and Dr Mendez updated.

## 2022-11-15 NOTE — PLAN OF CARE
Pt is alert and orient x 4 at the time of assessment. Pt did have a bm during day shift. Pt started complaining of throat, head, and facial right sided pain. Dr was notified. Pain improved with  dilaudid. Pt denies any nausea/vomiting. Will continue to monitor

## 2022-11-15 NOTE — PROGRESS NOTES
[facility] - Medical Surgical Unit  [facility] Medicine  Progress Note    Patient Name: Nilesh Shane  MRN: [id number]  Patient Class: IP- Inpatient   Admission Date: 11/14/2022  Length of Stay: 1 days  Attending Physician: Marc Saavedra MD  Primary Care Provider: Ingrid Carson MD        Subjective:     Principal Problem:Small bowel obstruction        HPI:  Patient is a 67 y.o. male seen through tele med with a nurse bedside and informed consent seen by me from home in [place] at [facility] presented with c/o abd pain and nausea with h/o clon cancer had his sigmoid colon removed developed an anastomotic leak which further led to a diverting ileostomy at [facility] was transferred from [facility] as [facility] was on diversion was found to have a SBO with abd distention and admitted to acute care with surgery consult.   - Bry Lo MD      Overview/Hospital Course:  11/14  - feels better. No abd pain or nausea. No flatus/BM.    11/15/2022.    Patient doing much better.  Nasogastric tube was removed yesterday and patient tolerated some ice chips without any nausea vomiting and diarrhea.    He did have a bowel movement yesterday is experiencing flatus to note today.    On examination still pain tenderness on right middle abdomen but nothing on the left.  There are some minimal bowel sounds present.    Continue IV fluid .   Since patient had a bowel movement, positive flatus recommend to start some clear liquids.  Will follow up with surgery.    Laboratory work reviewed I do not see any new development.  Continue conservative management.      Interval History:  Patient feels much better today.    Had a bowel movement yesterday.  No nausea and vomiting.  Nasogastric tube has been removed.  Review of Systems   Constitutional:  Negative for activity change, fatigue and fever.   HENT: Negative.     Eyes: Negative.    Respiratory: Negative.     Cardiovascular: Negative.    Gastrointestinal:  Positive  for abdominal distention and abdominal pain. Negative for nausea and rectal pain.   Neurological: Negative.    Hematological: Negative.    All other systems reviewed and are negative.  Objective:     Vital Signs (Most Recent):  Temp: 98.2 °F (36.8 °C) (11/15/22 0933)  Pulse: 65 (11/15/22 0933)  Resp: 20 (11/14/22 2258)  BP: (!) 160/77 (11/15/22 0933)  SpO2: 95 % (11/15/22 0933)   Vital Signs (24h Range):  Temp:  [97.5 °F (36.4 °C)-98.2 °F (36.8 °C)] 98.2 °F (36.8 °C)  Pulse:  [65-83] 65  Resp:  [18-20] 20  SpO2:  [92 %-95 %] 95 %  BP: (152-177)/(67-96) 160/77     Weight: 99.8 kg (220 lb)  Body mass index is 36.61 kg/m².    Intake/Output Summary (Last 24 hours) at 11/15/2022 1217  Last data filed at 11/15/2022 0547  Gross per 24 hour   Intake 100 ml   Output 1525 ml   Net -1425 ml      Physical Exam  Vitals and nursing note reviewed.   Constitutional:       General: He is not in acute distress.     Appearance: Normal appearance. He is normal weight. He is ill-appearing. He is not toxic-appearing or diaphoretic.   HENT:      Head: Normocephalic and atraumatic.      Nose: Nose normal.      Mouth/Throat:      Mouth: Mucous membranes are moist.   Eyes:      Extraocular Movements: Extraocular movements intact.      Conjunctiva/sclera: Conjunctivae normal.      Pupils: Pupils are equal, round, and reactive to light.   Cardiovascular:      Rate and Rhythm: Normal rate and regular rhythm.   Pulmonary:      Effort: Pulmonary effort is normal.      Breath sounds: Normal breath sounds.   Abdominal:      General: There is distension.      Tenderness: There is abdominal tenderness. There is guarding and rebound. There is no right CVA tenderness or left CVA tenderness.   Genitourinary:     Penis: Normal.    Musculoskeletal:         General: Normal range of motion.      Cervical back: Normal range of motion and neck supple.   Skin:     General: Skin is warm.      Capillary Refill: Capillary refill takes less than 2 seconds.    Neurological:      General: No focal deficit present.      Mental Status: He is alert and oriented to person, place, and time.   Psychiatric:         Mood and Affect: Mood normal.       Significant Labs: All pertinent labs within the past 24 hours have been reviewed.  Recent Lab Results  (Last 5 results in the past 24 hours)        11/15/22  1148   11/15/22  0550   11/15/22  0411   11/14/22  2032   11/14/22  1229        Albumin/Globulin Ratio     1.1           Albumin     3.3           Alkaline Phosphatase     123           ALT     18           AST     16           Baso #     0.06           Basophil %     0.5           BILIRUBIN TOTAL     2.1           BUN     19.0           Calcium     8.5           Chloride     108           CO2     26           Creatinine     1.29           eGFR     >60           Eos #     0.15           Eosinophil %     1.2           Globulin, Total     2.9           Glucose     197           Hematocrit     45.4           Hemoglobin     14.0           Immature Grans (Abs)     0.04           Immature Granulocytes     0.3           Lactate, Wagner         1.3       Lymph #     1.75           LYMPH %     13.5           MCH     28.3           MCHC     30.8           MCV     91.9           Mono #     1.28           Mono %     9.9           MPV     12.5           Neut #     9.7           Neut %     74.6           Platelets     193           POCT Glucose 167   181     201         Potassium     3.3           PROTEIN TOTAL     6.2           RBC     4.94           RDW     13.3           Sodium     143           WBC     13.0                                  Significant Imaging: I have reviewed all pertinent imaging results/findings within the past 24 hours.      Assessment/Plan:      History of colon resection  Keep NPO for now.  Start treatment with IV and subcutaneous medication for blood pressure hypertension and sugar diabetes      Diabetes mellitus  Hemoglobin A1c 6.8.    Continue as needed sliding  scale insulin for elevated blood sugar.    VTE Risk Mitigation (From admission, onward)         Ordered     IP VTE HIGH RISK PATIENT  Once         11/14/22 0440     Place sequential compression device  Until discontinued         11/14/22 0440     Place SUE hose  Until discontinued         11/14/22 0440                Discharge Planning   KEHINDE:      Code Status: Full Code   Is the patient medically ready for discharge?:     Reason for patient still in hospital (select all that apply): Treatment  Discharge Plan A: Home                  Hamilton Robertson MD  Department of Hospital Medicine   [facility] - Medical Surgical [facility]

## 2022-11-16 VITALS
RESPIRATION RATE: 18 BRPM | HEIGHT: 65 IN | TEMPERATURE: 98 F | OXYGEN SATURATION: 96 % | BODY MASS INDEX: 36.65 KG/M2 | HEART RATE: 63 BPM | SYSTOLIC BLOOD PRESSURE: 169 MMHG | DIASTOLIC BLOOD PRESSURE: 88 MMHG | WEIGHT: 220 LBS

## 2022-11-16 LAB
ALBUMIN SERPL-MCNC: 3.1 GM/DL (ref 3.4–4.8)
ALBUMIN/GLOB SERPL: 1.2 RATIO (ref 1.1–2)
ALP SERPL-CCNC: 100 UNIT/L (ref 40–150)
ALT SERPL-CCNC: 16 UNIT/L (ref 0–55)
AST SERPL-CCNC: 15 UNIT/L (ref 5–34)
BASOPHILS # BLD AUTO: 0.06 X10(3)/MCL (ref 0–0.2)
BASOPHILS NFR BLD AUTO: 0.6 %
BILIRUBIN DIRECT+TOT PNL SERPL-MCNC: 1.9 MG/DL
BUN SERPL-MCNC: 12 MG/DL (ref 8.4–25.7)
CALCIUM SERPL-MCNC: 8.3 MG/DL (ref 8.8–10)
CHLORIDE SERPL-SCNC: 106 MMOL/L (ref 98–107)
CO2 SERPL-SCNC: 26 MMOL/L (ref 23–31)
CREAT SERPL-MCNC: 1.12 MG/DL (ref 0.73–1.18)
EOSINOPHIL # BLD AUTO: 0.41 X10(3)/MCL (ref 0–0.9)
EOSINOPHIL NFR BLD AUTO: 4 %
ERYTHROCYTE [DISTWIDTH] IN BLOOD BY AUTOMATED COUNT: 13.2 % (ref 11.5–17)
GFR SERPLBLD CREATININE-BSD FMLA CKD-EPI: >60 MLS/MIN/1.73/M2
GLOBULIN SER-MCNC: 2.6 GM/DL (ref 2.4–3.5)
GLUCOSE SERPL-MCNC: 158 MG/DL (ref 82–115)
HCT VFR BLD AUTO: 42.1 % (ref 42–52)
HGB BLD-MCNC: 13.3 GM/DL (ref 14–18)
IMM GRANULOCYTES # BLD AUTO: 0.03 X10(3)/MCL (ref 0–0.04)
IMM GRANULOCYTES NFR BLD AUTO: 0.3 %
LIPASE SERPL-CCNC: 13 U/L
LYMPHOCYTES # BLD AUTO: 1.77 X10(3)/MCL (ref 0.6–4.6)
LYMPHOCYTES NFR BLD AUTO: 17.3 %
MAGNESIUM SERPL-MCNC: 1.9 MG/DL (ref 1.6–2.6)
MCH RBC QN AUTO: 28.4 PG (ref 27–31)
MCHC RBC AUTO-ENTMCNC: 31.6 MG/DL (ref 33–36)
MCV RBC AUTO: 90 FL (ref 80–94)
MONOCYTES # BLD AUTO: 0.82 X10(3)/MCL (ref 0.1–1.3)
MONOCYTES NFR BLD AUTO: 8 %
NEUTROPHILS # BLD AUTO: 7.2 X10(3)/MCL (ref 2.1–9.2)
NEUTROPHILS NFR BLD AUTO: 69.8 %
PHOSPHATE SERPL-MCNC: 2.1 MG/DL (ref 2.3–4.7)
PLATELET # BLD AUTO: 195 X10(3)/MCL (ref 130–400)
PMV BLD AUTO: 12.5 FL (ref 7.4–10.4)
POCT GLUCOSE: 127 MG/DL (ref 70–110)
POTASSIUM SERPL-SCNC: 3 MMOL/L (ref 3.5–5.1)
PROT SERPL-MCNC: 5.7 GM/DL (ref 5.8–7.6)
RBC # BLD AUTO: 4.68 X10(6)/MCL (ref 4.7–6.1)
SODIUM SERPL-SCNC: 141 MMOL/L (ref 136–145)
WBC # SPEC AUTO: 10.2 X10(3)/MCL (ref 4.5–11.5)

## 2022-11-16 PROCEDURE — 94761 N-INVAS EAR/PLS OXIMETRY MLT: CPT

## 2022-11-16 PROCEDURE — 83690 ASSAY OF LIPASE: CPT | Performed by: EMERGENCY MEDICINE

## 2022-11-16 PROCEDURE — S0030 INJECTION, METRONIDAZOLE: HCPCS | Performed by: SURGERY

## 2022-11-16 PROCEDURE — 25000003 PHARM REV CODE 250: Performed by: EMERGENCY MEDICINE

## 2022-11-16 PROCEDURE — 63600175 PHARM REV CODE 636 W HCPCS: Performed by: INTERNAL MEDICINE

## 2022-11-16 PROCEDURE — 25000003 PHARM REV CODE 250: Performed by: INTERNAL MEDICINE

## 2022-11-16 PROCEDURE — 80053 COMPREHEN METABOLIC PANEL: CPT | Performed by: EMERGENCY MEDICINE

## 2022-11-16 PROCEDURE — 25000003 PHARM REV CODE 250: Performed by: SURGERY

## 2022-11-16 PROCEDURE — 36415 COLL VENOUS BLD VENIPUNCTURE: CPT | Performed by: EMERGENCY MEDICINE

## 2022-11-16 PROCEDURE — 85025 COMPLETE CBC W/AUTO DIFF WBC: CPT | Performed by: EMERGENCY MEDICINE

## 2022-11-16 PROCEDURE — 84100 ASSAY OF PHOSPHORUS: CPT | Performed by: EMERGENCY MEDICINE

## 2022-11-16 PROCEDURE — 83735 ASSAY OF MAGNESIUM: CPT | Performed by: EMERGENCY MEDICINE

## 2022-11-16 RX ADMIN — VALSARTAN 160 MG: 80 TABLET ORAL at 09:11

## 2022-11-16 RX ADMIN — HYDROMORPHONE HYDROCHLORIDE 1 MG: 2 INJECTION, SOLUTION INTRAMUSCULAR; INTRAVENOUS; SUBCUTANEOUS at 02:11

## 2022-11-16 RX ADMIN — PIPERACILLIN AND TAZOBACTAM 4.5 G: 4; .5 INJECTION, POWDER, LYOPHILIZED, FOR SOLUTION INTRAVENOUS; PARENTERAL at 06:11

## 2022-11-16 RX ADMIN — METRONIDAZOLE 500 MG: 5 INJECTION, SOLUTION INTRAVENOUS at 12:11

## 2022-11-16 NOTE — PROGRESS NOTES
[facility]  Medical Surgical Unit  General Surgery  Progress Note    Subjective:     Interval History:  No acute events reported by nursing staff the patient overnight.  Patient having 5 bowel movements since yesterday afternoon.  Tolerated liquid diet without difficulty.  Denies abdominal pain cramping or discomfort.  Denies nausea vomiting.  Clinically appears to be resolving from partial small-bowel obstruction.      Post-Op Info:  * No surgery found *          Medications:  Continuous Infusions:  Scheduled Meds:   enoxaparin  40 mg Subcutaneous Daily    fluticasone propionate  1 spray Each Nostril Daily    metronidazole  500 mg Intravenous Q8H    mupirocin   Nasal BID    piperacillin-tazobactam (ZOSYN) IVPB  4.5 g Intravenous Q8H    valsartan  160 mg Oral Daily     PRN Meds:dextrose 10%, dextrose 10%, dextrose 50%, diphenhydrAMINE, glucagon (human recombinant), glucagon (human recombinant), HYDROmorphone, insulin aspart U-100, labetaloL, melatonin, morphine, naloxone, ondansetron, prochlorperazine, sodium chloride 0.9%, sodium chloride 0.9%     Objective:     Vital Signs (Most Recent):  Temp: 97.9 °F (36.6 °C) (11/16/22 0528)  Pulse: 63 (11/16/22 0528)  Resp: 18 (11/16/22 0229)  BP: (!) 169/88 (11/16/22 0528)  SpO2: 98 % (11/16/22 0528)   Vital Signs (24h Range):  Temp:  [97.9 °F (36.6 °C)-98.7 °F (37.1 °C)] 97.9 °F (36.6 °C)  Pulse:  [59-69] 63  Resp:  [18-20] 18  SpO2:  [95 %-98 %] 98 %  BP: (156-169)/(77-96) 169/88     No intake or output data in the 24 hours ending 11/16/22 0739    Physical Exam  Abdominal:      General: There is no distension.      Palpations: Abdomen is soft. There is no mass.      Tenderness: There is no abdominal tenderness. There is no guarding or rebound.      Hernia: A hernia is present.       Significant Labs:  CBC:   Recent Labs   Lab 11/16/22  0352   WBC 10.2   RBC 4.68*   HGB 13.3*   HCT 42.1      MCV 90.0   MCH 28.4   MCHC 31.6*     CMP:   Recent Labs   Lab  11/16/22  0352   CALCIUM 8.3*   ALBUMIN 3.1*      K 3.0*   CO2 26   BUN 12.0   CREATININE 1.12   ALKPHOS 100   ALT 16   AST 15   BILITOT 1.9*       Significant Diagnostics:  None    Assessment/Plan:     Active Diagnoses:    Diagnosis Date Noted POA    PRINCIPAL PROBLEM:  Small bowel obstruction [K56.609] 11/15/2022 Yes     Chronic    Intractable abdominal pain [R10.9] 11/15/2022 Yes     Chronic    Colon cancer [C18.9] 11/15/2022 Yes     Chronic    History of colon resection [Z90.49] 11/15/2022 Not Applicable     Chronic    Diabetes mellitus [E11.9] 07/14/2022 Yes     Chronic      Problems Resolved During this Admission:   Patient has shown significant clinical improvement and tolerating liquid diet at this time with multiple bowel movements and resolution of discomfort.  Patient did have some mild fecalization of the small bowel and will be instructed to maintain a liquid diet for the next 4-5 days to clear the small intestine out.  Following that time he has been instructed to maintain a low residue diet.  Patient will plan to follow up with Dr. Almaraz upon discharge for other instruction  Patient appears to be clinically stable for discharge to home  Patient provided education with regards to symptoms that would be concerning for recurrence of partial small-bowel obstruction and the need for immediate follow-up if those do occur.    Mary Mendez MD  General Surgery  [facility] - Medical Surgical Unit

## 2022-11-16 NOTE — DISCHARGE SUMMARY
[facility] - Medical Surgical Unit  Hospital Medicine  Discharge Summary      Patient Name: Nilesh Shane  MRN: [id number]  LONI: [id number]  Patient Class: IP- Inpatient  Admission Date: 11/14/2022  Hospital Length of Stay: 2 days  Discharge Date and Time:  11/16/2022 10:54 AM  Attending Physician: Marc Saavedra MD   Discharging Provider: Hamilton Robertson MD  Primary Care Provider: Ingrid Carson MD    Primary Care Team: Networked reference to record PCT     HPI:   Patient is a 67 y.o. male seen through tele med with a nurse bedside and informed consent seen by me from home in [place] at [facility] presented with c/o abd pain and nausea with h/o clon cancer had his sigmoid colon removed developed an anastomotic leak which further led to a diverting ileostomy at [facility] was transferred from [facility] as [facility] was on diversion was found to have a SBO with abd distention and admitted to acute care with surgery consult.   - Bry Lo MD      * No surgery found *      Hospital Course:   11/14  - feels better. No abd pain or nausea. No flatus/BM.    11/15/2022.    Patient doing much better.  Nasogastric tube was removed yesterday and patient tolerated some ice chips without any nausea vomiting and diarrhea.    He did have a bowel movement yesterday is experiencing flatus to note today.    On examination still pain tenderness on right middle abdomen but nothing on the left.  There are some minimal bowel sounds present.    Continue IV fluid .   Since patient had a bowel movement, positive flatus recommend to start some clear liquids.  Will follow up with surgery.    Laboratory work reviewed I do not see any new development.  Continue conservative management.  11/16/2022  Patient doing very well.  Tolerated p.o. without any problem.  No nausea, no vomiting, no diarrhea and had bowel movement.    On examination present bowel sounds and no pain.    Seen evaluated by surgery as well.  Agree to  discharge patient home.    Continue clear liquid and advance as tolerated.  Recommend not regular diet for the next week.    Follow up with surgeon in about 1 week       Goals of Care Treatment Preferences:  Code Status: Full Code      Consults:   Consults (From admission, onward)        Status Ordering Provider     Inpatient consult to General Surgery  Once        Provider:  Mary Mendez MD    Acknowledged MESHA BREWER          * Small bowel obstruction  Resolved at this time.      Intractable abdominal pain  Stable pain at this time.      History of colon resection  Follow-up with surgery as scheduled.    Colon cancer  Follow up with primary care physician as outpatient.      Final Active Diagnoses:    Diagnosis Date Noted POA    PRINCIPAL PROBLEM:  Small bowel obstruction [K56.609] 11/15/2022 Yes     Chronic    Intractable abdominal pain [R10.9] 11/15/2022 Yes     Chronic    History of colon resection [Z90.49] 11/15/2022 Not Applicable     Chronic    Colon cancer [C18.9] 11/15/2022 Yes     Chronic    Diabetes mellitus [E11.9] 07/14/2022 Yes     Chronic      Problems Resolved During this Admission:       Discharged Condition: good    Disposition: Home or Self Care    Follow Up:   Follow-up Information     Ingrid Carson MD. Call in 1 week(s).    Specialty: Family Medicine  Contact information:  [address] [place] [address] [id number] [phone]                       Patient Instructions:      Diet full liquid     Notify your health care provider if you experience any of the following:  temperature >100.4     Notify your health care provider if you experience any of the following:  persistent nausea and vomiting or diarrhea     Activity as tolerated       Significant Diagnostic Studies: Labs:   BMP:   Recent Labs   Lab 11/15/22  0411 11/16/22  0352    141   K 3.3* 3.0*   CO2 26 26   BUN 19.0 12.0   CREATININE 1.29* 1.12   CALCIUM 8.5* 8.3*   MG  --  1.90   , CMP   Recent Labs   Lab  11/15/22  0411 11/16/22  0352    141   K 3.3* 3.0*   CO2 26 26   BUN 19.0 12.0   CREATININE 1.29* 1.12   CALCIUM 8.5* 8.3*   ALBUMIN 3.3* 3.1*   BILITOT 2.1* 1.9*   ALKPHOS 123 100   AST 16 15   ALT 18 16    and CBC   Recent Labs   Lab 11/15/22  0411 11/16/22  0352   WBC 13.0* 10.2   HGB 14.0 13.3*   HCT 45.4 42.1    195     Radiology: CT scan: CT ABDOMEN PELVIS WITH CONTRAST: No results found for this visit on 11/14/22. and CT ABDOMEN PELVIS WITHOUT CONTRAST: No results found for this visit on 11/14/22.    Pending Diagnostic Studies:     None         Medications:  Reconciled Home Medications:      Medication List      CONTINUE taking these medications    amLODIPine 10 MG tablet  Commonly known as: NORVASC  Take 1 tablet (10 mg total) by mouth once daily at 6am.     atorvastatin 10 MG tablet  Commonly known as: LIPITOR  Take 10 mg by mouth once daily.     cholecalciferol (vitamin D3) 25 mcg (1,000 unit) capsule  Commonly known as: VITAMIN D3  Take 1,000 Units by mouth once daily.     fluticasone propionate 50 mcg/actuation nasal spray  Commonly known as: FLONASE  by Nasal route.     glipiZIDE 10 MG Tr24  Commonly known as: GLUCOTROL  Take 1 tablet (10 mg total) by mouth once daily at 6am.     hydroCHLOROthiazide 25 MG tablet  Commonly known as: HYDRODIURIL  Take 1 tablet by mouth once daily.     JARDIANCE 25 mg tablet  Generic drug: empagliflozin  Take 1 tablet (25 mg total) by mouth once daily.     omeprazole 20 MG capsule  Commonly known as: PRILOSEC  TAKE 1 CAPSULE EVERY DAY     pioglitazone 30 MG tablet  Commonly known as: ACTOS  TAKE 1 TABLET EVERY DAY     valsartan 160 MG tablet  Commonly known as: DIOVAN  Take 1 tablet (160 mg total) by mouth once daily.        STOP taking these medications    ascorbic acid (vitamin C) 1000 MG tablet  Commonly known as: VITAMIN C     B-complex with vitamin C tablet  Commonly known as: Z-Bec or Equiv            Indwelling Lines/Drains at time of discharge:    Lines/Drains/Airways     None                 Time spent on the discharge of patient: 32 minutes         Hamilton Robertson MD  Department of Hospital Medicine  [facility] - Medical Surgical Unit

## 2022-11-16 NOTE — PLAN OF CARE
Pt lying in bed with HOB elevated alert and awake. Resp even and unlabored no distress or sob noted at this time. Pt tolerating clear liquid diet encouraging fluids. Pt states he has had multiple Bms today between 4-5. BS active x4 Quads. No c/o pain at this time. Abdomen round slightly distended no pain noted. Will continue to monitor for any changes.

## 2022-11-17 ENCOUNTER — PATIENT OUTREACH (OUTPATIENT)
Dept: ADMINISTRATIVE | Facility: CLINIC | Age: 67
End: 2022-11-17
Payer: MEDICARE

## 2022-11-17 NOTE — PROGRESS NOTES
C3 nurse attempted to contact Nilesh Shane for a TCC post hospital discharge follow up call. No answer. Left voicemail with callback information. The patient does not have a scheduled HOSFU appointment. Message sent to PCP staff for assistance with scheduling visit with patient.

## 2022-11-18 NOTE — PROGRESS NOTES
C3 nurse spoke with patient.  Patient declined call stating he was at work and everything was fine.

## 2023-01-15 NOTE — ASSESSMENT & PLAN NOTE
Keep NPO for now.  Start treatment with IV and subcutaneous medication for blood pressure hypertension and sugar diabetes     13 yo male otherwise healthy presenting to the ED with episode of dizziness today while at [employer] with feeling like he was going to pass out, reported palpitations on friday. no underlying cardiac issues reported in pt or family. no focal deficits on exam.   EKG fu outpt with pediatrician

## 2023-01-23 PROBLEM — K43.0 INCARCERATED INCISIONAL HERNIA: Status: ACTIVE | Noted: 2022-12-11

## 2023-01-31 ENCOUNTER — LAB VISIT (OUTPATIENT)
Dept: LAB | Facility: HOSPITAL | Age: 68
End: 2023-01-31
Attending: FAMILY MEDICINE
Payer: MEDICARE

## 2023-01-31 DIAGNOSIS — E53.8 VITAMIN B12 DEFICIENCY: ICD-10-CM

## 2023-01-31 DIAGNOSIS — N32.81 OAB (OVERACTIVE BLADDER): ICD-10-CM

## 2023-01-31 DIAGNOSIS — E78.2 MIXED HYPERLIPIDEMIA: ICD-10-CM

## 2023-01-31 DIAGNOSIS — Z12.5 SCREENING PSA (PROSTATE SPECIFIC ANTIGEN): ICD-10-CM

## 2023-01-31 DIAGNOSIS — E11.65 TYPE 2 DIABETES MELLITUS WITH HYPERGLYCEMIA, WITHOUT LONG-TERM CURRENT USE OF INSULIN: Chronic | ICD-10-CM

## 2023-01-31 DIAGNOSIS — E03.9 HYPOTHYROIDISM, UNSPECIFIED TYPE: ICD-10-CM

## 2023-01-31 DIAGNOSIS — E55.9 VITAMIN D DEFICIENCY: ICD-10-CM

## 2023-01-31 DIAGNOSIS — I10 PRIMARY HYPERTENSION: ICD-10-CM

## 2023-01-31 LAB
ALBUMIN SERPL-MCNC: 3.8 G/DL (ref 3.4–4.8)
ALBUMIN/GLOB SERPL: 1.2 RATIO (ref 1.1–2)
ALP SERPL-CCNC: 139 UNIT/L (ref 40–150)
ALT SERPL-CCNC: 17 UNIT/L (ref 0–55)
APPEARANCE UR: CLEAR
AST SERPL-CCNC: 20 UNIT/L (ref 5–34)
BACTERIA #/AREA URNS AUTO: NORMAL /HPF
BASOPHILS # BLD AUTO: 0.03 X10(3)/MCL (ref 0–0.2)
BASOPHILS NFR BLD AUTO: 0.4 %
BILIRUB UR QL STRIP.AUTO: NEGATIVE MG/DL
BILIRUBIN DIRECT+TOT PNL SERPL-MCNC: 1.3 MG/DL
BUN SERPL-MCNC: 24.2 MG/DL (ref 8.4–25.7)
CALCIUM SERPL-MCNC: 9.3 MG/DL (ref 8.8–10)
CHLORIDE SERPL-SCNC: 106 MMOL/L (ref 98–107)
CHOLEST SERPL-MCNC: 149 MG/DL
CHOLEST/HDLC SERPL: 4 {RATIO} (ref 0–5)
CO2 SERPL-SCNC: 25 MMOL/L (ref 23–31)
COLOR UR AUTO: YELLOW
CREAT SERPL-MCNC: 1.37 MG/DL (ref 0.73–1.18)
DEPRECATED CALCIDIOL+CALCIFEROL SERPL-MC: 24.6 NG/ML (ref 30–80)
EOSINOPHIL # BLD AUTO: 0.31 X10(3)/MCL (ref 0–0.9)
EOSINOPHIL NFR BLD AUTO: 3.8 %
ERYTHROCYTE [DISTWIDTH] IN BLOOD BY AUTOMATED COUNT: 14.2 % (ref 11.5–17)
EST. AVERAGE GLUCOSE BLD GHB EST-MCNC: 159.9 MG/DL
GFR SERPLBLD CREATININE-BSD FMLA CKD-EPI: 57 MLS/MIN/1.73/M2
GLOBULIN SER-MCNC: 3.2 GM/DL (ref 2.4–3.5)
GLUCOSE SERPL-MCNC: 144 MG/DL (ref 82–115)
GLUCOSE UR QL STRIP.AUTO: NEGATIVE MG/DL
HBA1C MFR BLD: 7.2 %
HCT VFR BLD AUTO: 48 % (ref 42–52)
HDLC SERPL-MCNC: 40 MG/DL (ref 35–60)
HGB BLD-MCNC: 14.8 GM/DL (ref 14–18)
IMM GRANULOCYTES # BLD AUTO: 0.02 X10(3)/MCL (ref 0–0.04)
IMM GRANULOCYTES NFR BLD AUTO: 0.2 %
KETONES UR QL STRIP.AUTO: NEGATIVE MG/DL
LDLC SERPL CALC-MCNC: 89 MG/DL (ref 50–140)
LEUKOCYTE ESTERASE UR QL STRIP.AUTO: NEGATIVE UNIT/L
LYMPHOCYTES # BLD AUTO: 1.89 X10(3)/MCL (ref 0.6–4.6)
LYMPHOCYTES NFR BLD AUTO: 23.2 %
MCH RBC QN AUTO: 27.4 PG
MCHC RBC AUTO-ENTMCNC: 30.8 MG/DL (ref 33–36)
MCV RBC AUTO: 88.7 FL (ref 80–94)
MONOCYTES # BLD AUTO: 0.54 X10(3)/MCL (ref 0.1–1.3)
MONOCYTES NFR BLD AUTO: 6.6 %
NEUTROPHILS # BLD AUTO: 5.35 X10(3)/MCL (ref 2.1–9.2)
NEUTROPHILS NFR BLD AUTO: 65.8 %
NITRITE UR QL STRIP.AUTO: NEGATIVE
PH UR STRIP.AUTO: 7 [PH]
PLATELET # BLD AUTO: 238 X10(3)/MCL (ref 130–400)
PMV BLD AUTO: 11.4 FL (ref 7.4–10.4)
POTASSIUM SERPL-SCNC: 4.2 MMOL/L (ref 3.5–5.1)
PROT SERPL-MCNC: 7 GM/DL (ref 5.8–7.6)
PROT UR QL STRIP.AUTO: 30 MG/DL
PSA SERPL-MCNC: 0.49 NG/ML
RBC # BLD AUTO: 5.41 X10(6)/MCL (ref 4.7–6.1)
RBC #/AREA URNS AUTO: NORMAL /HPF
RBC UR QL AUTO: NEGATIVE UNIT/L
SODIUM SERPL-SCNC: 141 MMOL/L (ref 136–145)
SP GR UR STRIP.AUTO: 1.02
SQUAMOUS #/AREA URNS AUTO: NORMAL /HPF
TRIGL SERPL-MCNC: 100 MG/DL (ref 34–140)
TSH SERPL-ACNC: 1.51 UIU/ML (ref 0.35–4.94)
UROBILINOGEN UR STRIP-ACNC: 0.2 MG/DL
VIT B12 SERPL-MCNC: 298 PG/ML (ref 213–816)
VLDLC SERPL CALC-MCNC: 20 MG/DL
WBC # SPEC AUTO: 8.1 X10(3)/MCL (ref 4.5–11.5)
WBC #/AREA URNS AUTO: NORMAL /HPF

## 2023-01-31 PROCEDURE — 83036 HEMOGLOBIN GLYCOSYLATED A1C: CPT

## 2023-01-31 PROCEDURE — 85025 COMPLETE CBC W/AUTO DIFF WBC: CPT

## 2023-01-31 PROCEDURE — 82607 VITAMIN B-12: CPT

## 2023-01-31 PROCEDURE — 80061 LIPID PANEL: CPT

## 2023-01-31 PROCEDURE — 84153 ASSAY OF PSA TOTAL: CPT

## 2023-01-31 PROCEDURE — 81003 URINALYSIS AUTO W/O SCOPE: CPT

## 2023-01-31 PROCEDURE — 80053 COMPREHEN METABOLIC PANEL: CPT

## 2023-01-31 PROCEDURE — 36415 COLL VENOUS BLD VENIPUNCTURE: CPT

## 2023-01-31 PROCEDURE — 82306 VITAMIN D 25 HYDROXY: CPT

## 2023-01-31 PROCEDURE — 84443 ASSAY THYROID STIM HORMONE: CPT

## 2023-04-24 ENCOUNTER — LAB VISIT (OUTPATIENT)
Dept: LAB | Facility: HOSPITAL | Age: 68
End: 2023-04-24
Attending: INTERNAL MEDICINE
Payer: MEDICARE

## 2023-04-24 DIAGNOSIS — I10 ESSENTIAL HYPERTENSION, MALIGNANT: Primary | ICD-10-CM

## 2023-04-24 LAB
ANION GAP SERPL CALC-SCNC: 10 MEQ/L
BUN SERPL-MCNC: 23.1 MG/DL (ref 8.4–25.7)
CALCIUM SERPL-MCNC: 9.5 MG/DL (ref 8.8–10)
CHLORIDE SERPL-SCNC: 106 MMOL/L (ref 98–107)
CO2 SERPL-SCNC: 27 MMOL/L (ref 23–31)
CREAT SERPL-MCNC: 1.41 MG/DL (ref 0.73–1.18)
CREAT/UREA NIT SERPL: 16
GFR SERPLBLD CREATININE-BSD FMLA CKD-EPI: 54 MLS/MIN/1.73/M2
GLUCOSE SERPL-MCNC: 134 MG/DL (ref 82–115)
POTASSIUM SERPL-SCNC: 3.9 MMOL/L (ref 3.5–5.1)
SODIUM SERPL-SCNC: 143 MMOL/L (ref 136–145)

## 2023-04-24 PROCEDURE — 80048 BASIC METABOLIC PNL TOTAL CA: CPT

## 2023-04-24 PROCEDURE — 36415 COLL VENOUS BLD VENIPUNCTURE: CPT

## 2023-07-24 PROBLEM — Z93.2 ILEOSTOMY IN PLACE: Status: ACTIVE | Noted: 2023-07-24

## 2023-07-24 PROBLEM — Z93.2 ILEOSTOMY IN PLACE: Status: RESOLVED | Noted: 2023-07-24 | Resolved: 2023-07-24

## 2023-07-25 ENCOUNTER — LAB VISIT (OUTPATIENT)
Dept: LAB | Facility: HOSPITAL | Age: 68
End: 2023-07-25
Attending: INTERNAL MEDICINE
Payer: MEDICARE

## 2023-07-25 DIAGNOSIS — E11.65 TYPE 2 DIABETES MELLITUS WITH HYPERGLYCEMIA, WITHOUT LONG-TERM CURRENT USE OF INSULIN: ICD-10-CM

## 2023-07-25 DIAGNOSIS — E78.2 MIXED HYPERLIPIDEMIA: ICD-10-CM

## 2023-07-25 LAB
ALBUMIN SERPL-MCNC: 3.8 G/DL (ref 3.4–4.8)
ALBUMIN/GLOB SERPL: 1.2 RATIO (ref 1.1–2)
ALP SERPL-CCNC: 122 UNIT/L (ref 40–150)
ALT SERPL-CCNC: 18 UNIT/L (ref 0–55)
AST SERPL-CCNC: 17 UNIT/L (ref 5–34)
BASOPHILS # BLD AUTO: 0.03 X10(3)/MCL
BASOPHILS NFR BLD AUTO: 0.4 %
BILIRUBIN DIRECT+TOT PNL SERPL-MCNC: 0.9 MG/DL
BUN SERPL-MCNC: 24.3 MG/DL (ref 8.4–25.7)
CALCIUM SERPL-MCNC: 9.4 MG/DL (ref 8.8–10)
CHLORIDE SERPL-SCNC: 106 MMOL/L (ref 98–107)
CHOLEST SERPL-MCNC: 141 MG/DL
CHOLEST/HDLC SERPL: 4 {RATIO} (ref 0–5)
CO2 SERPL-SCNC: 28 MMOL/L (ref 23–31)
CREAT SERPL-MCNC: 1.44 MG/DL (ref 0.73–1.18)
EOSINOPHIL # BLD AUTO: 0.33 X10(3)/MCL (ref 0–0.9)
EOSINOPHIL NFR BLD AUTO: 4 %
ERYTHROCYTE [DISTWIDTH] IN BLOOD BY AUTOMATED COUNT: 13.4 % (ref 11.5–17)
EST. AVERAGE GLUCOSE BLD GHB EST-MCNC: 122.6 MG/DL
GFR SERPLBLD CREATININE-BSD FMLA CKD-EPI: 53 MLS/MIN/1.73/M2
GLOBULIN SER-MCNC: 3.2 GM/DL (ref 2.4–3.5)
GLUCOSE SERPL-MCNC: 128 MG/DL (ref 82–115)
HBA1C MFR BLD: 5.9 %
HCT VFR BLD AUTO: 45.6 % (ref 42–52)
HDLC SERPL-MCNC: 38 MG/DL (ref 35–60)
HGB BLD-MCNC: 14.4 G/DL (ref 14–18)
IMM GRANULOCYTES # BLD AUTO: 0.04 X10(3)/MCL (ref 0–0.04)
IMM GRANULOCYTES NFR BLD AUTO: 0.5 %
LDLC SERPL CALC-MCNC: 83 MG/DL (ref 50–140)
LYMPHOCYTES # BLD AUTO: 2.03 X10(3)/MCL (ref 0.6–4.6)
LYMPHOCYTES NFR BLD AUTO: 24.9 %
MCH RBC QN AUTO: 29.6 PG (ref 27–31)
MCHC RBC AUTO-ENTMCNC: 31.6 G/DL (ref 33–36)
MCV RBC AUTO: 93.6 FL (ref 80–94)
MONOCYTES # BLD AUTO: 0.69 X10(3)/MCL (ref 0.1–1.3)
MONOCYTES NFR BLD AUTO: 8.5 %
NEUTROPHILS # BLD AUTO: 5.04 X10(3)/MCL (ref 2.1–9.2)
NEUTROPHILS NFR BLD AUTO: 61.7 %
PLATELET # BLD AUTO: 219 X10(3)/MCL (ref 130–400)
PMV BLD AUTO: 11.5 FL (ref 7.4–10.4)
POTASSIUM SERPL-SCNC: 3.9 MMOL/L (ref 3.5–5.1)
PROT SERPL-MCNC: 7 GM/DL (ref 5.8–7.6)
RBC # BLD AUTO: 4.87 X10(6)/MCL (ref 4.7–6.1)
SODIUM SERPL-SCNC: 142 MMOL/L (ref 136–145)
TRIGL SERPL-MCNC: 98 MG/DL (ref 34–140)
VLDLC SERPL CALC-MCNC: 20 MG/DL
WBC # SPEC AUTO: 8.16 X10(3)/MCL (ref 4.5–11.5)

## 2023-07-25 PROCEDURE — 80053 COMPREHEN METABOLIC PANEL: CPT

## 2023-07-25 PROCEDURE — 80061 LIPID PANEL: CPT

## 2023-07-25 PROCEDURE — 83036 HEMOGLOBIN GLYCOSYLATED A1C: CPT

## 2023-07-25 PROCEDURE — 36415 COLL VENOUS BLD VENIPUNCTURE: CPT

## 2023-07-25 PROCEDURE — 85025 COMPLETE CBC W/AUTO DIFF WBC: CPT

## 2023-09-07 ENCOUNTER — HOSPITAL ENCOUNTER (OUTPATIENT)
Dept: RADIOLOGY | Facility: HOSPITAL | Age: 68
Discharge: HOME | End: 2023-09-07
Attending: FAMILY MEDICINE
Payer: MEDICARE

## 2023-09-07 DIAGNOSIS — L08.9 FINGER INFECTION: ICD-10-CM

## 2023-09-07 PROBLEM — N18.31 CHRONIC KIDNEY DISEASE, STAGE 3A: Status: ACTIVE | Noted: 2023-09-07

## 2023-09-07 PROCEDURE — 73140 X-RAY EXAM OF FINGER(S): CPT | Mod: TC,RT

## 2023-09-11 ENCOUNTER — OFFICE VISIT (OUTPATIENT)
Dept: ORTHOPEDICS | Facility: CLINIC | Age: 68
End: 2023-09-11
Payer: MEDICARE

## 2023-09-11 DIAGNOSIS — G56.20 ULNAR NEUROPATHY AT ELBOW, UNSPECIFIED LATERALITY: ICD-10-CM

## 2023-09-11 DIAGNOSIS — Z01.818 PREOP TESTING: Primary | ICD-10-CM

## 2023-09-11 DIAGNOSIS — G56.03 BILATERAL CARPAL TUNNEL SYNDROME: ICD-10-CM

## 2023-09-11 PROCEDURE — 1159F MED LIST DOCD IN RCRD: CPT | Mod: CPTII,,, | Performed by: ORTHOPAEDIC SURGERY

## 2023-09-11 PROCEDURE — 1125F PR PAIN SEVERITY QUANTIFIED, PAIN PRESENT: ICD-10-PCS | Mod: CPTII,,, | Performed by: ORTHOPAEDIC SURGERY

## 2023-09-11 PROCEDURE — 1160F RVW MEDS BY RX/DR IN RCRD: CPT | Mod: CPTII,,, | Performed by: ORTHOPAEDIC SURGERY

## 2023-09-11 PROCEDURE — 4010F PR ACE/ARB THEARPY RXD/TAKEN: ICD-10-PCS | Mod: CPTII,,, | Performed by: ORTHOPAEDIC SURGERY

## 2023-09-11 PROCEDURE — 3044F HG A1C LEVEL LT 7.0%: CPT | Mod: CPTII,,, | Performed by: ORTHOPAEDIC SURGERY

## 2023-09-11 PROCEDURE — 1101F PT FALLS ASSESS-DOCD LE1/YR: CPT | Mod: CPTII,,, | Performed by: ORTHOPAEDIC SURGERY

## 2023-09-11 PROCEDURE — 3044F PR MOST RECENT HEMOGLOBIN A1C LEVEL <7.0%: ICD-10-PCS | Mod: CPTII,,, | Performed by: ORTHOPAEDIC SURGERY

## 2023-09-11 PROCEDURE — 1159F PR MEDICATION LIST DOCUMENTED IN MEDICAL RECORD: ICD-10-PCS | Mod: CPTII,,, | Performed by: ORTHOPAEDIC SURGERY

## 2023-09-11 PROCEDURE — 1125F AMNT PAIN NOTED PAIN PRSNT: CPT | Mod: CPTII,,, | Performed by: ORTHOPAEDIC SURGERY

## 2023-09-11 PROCEDURE — 99204 OFFICE O/P NEW MOD 45 MIN: CPT | Mod: ,,, | Performed by: ORTHOPAEDIC SURGERY

## 2023-09-11 PROCEDURE — 3288F FALL RISK ASSESSMENT DOCD: CPT | Mod: CPTII,,, | Performed by: ORTHOPAEDIC SURGERY

## 2023-09-11 PROCEDURE — 1101F PR PT FALLS ASSESS DOC 0-1 FALLS W/OUT INJ PAST YR: ICD-10-PCS | Mod: CPTII,,, | Performed by: ORTHOPAEDIC SURGERY

## 2023-09-11 PROCEDURE — 99204 PR OFFICE/OUTPT VISIT, NEW, LEVL IV, 45-59 MIN: ICD-10-PCS | Mod: ,,, | Performed by: ORTHOPAEDIC SURGERY

## 2023-09-11 PROCEDURE — 3288F PR FALLS RISK ASSESSMENT DOCUMENTED: ICD-10-PCS | Mod: CPTII,,, | Performed by: ORTHOPAEDIC SURGERY

## 2023-09-11 PROCEDURE — 1160F PR REVIEW ALL MEDS BY PRESCRIBER/CLIN PHARMACIST DOCUMENTED: ICD-10-PCS | Mod: CPTII,,, | Performed by: ORTHOPAEDIC SURGERY

## 2023-09-11 PROCEDURE — 4010F ACE/ARB THERAPY RXD/TAKEN: CPT | Mod: CPTII,,, | Performed by: ORTHOPAEDIC SURGERY

## 2023-09-11 RX ORDER — SODIUM CHLORIDE, SODIUM GLUCONATE, SODIUM ACETATE, POTASSIUM CHLORIDE AND MAGNESIUM CHLORIDE 30; 37; 368; 526; 502 MG/100ML; MG/100ML; MG/100ML; MG/100ML; MG/100ML
INJECTION, SOLUTION INTRAVENOUS CONTINUOUS
Status: CANCELLED | OUTPATIENT
Start: 2023-09-11

## 2023-09-11 NOTE — PROGRESS NOTES
Subjective:    CC: Pain of the Left Wrist, Pain of the Right Wrist, and Pain (lucio carpal tunnel no prior sx, NCS results are in chart.pt states no pain fingers get numb at night time and keeps him awake, Taking ibuprofen for pain.)       HPI:  Patient comes in today complaining of bilateral numbness and tingling right greater than left.  Patient states it gradually getting worse, he states it is affecting his daily activities, difficulty with holding a pencil, constantly dropping things.  He did have a recent nerve conduction study.  He denies other complaints.    ROS: Refer to HPI for pertinent ROS. All other 12 point systems negative.    Objective:  There were no vitals filed for this visit.     Physical Exam:  Patient is well-nourished developed male he is awake alert and oriented x3 he is in no apparent stress is pleasant and cooperative.  Examination of the bilateral upper extremities compartment soft and warm.  Skin is intact.  There is no signs symptoms of DVT or infection.  Examination of the right hand has questionable Tinel's positive Phalen's.  There is no gross thenar hypothenar wasting.  He is difficulty making a full fist has full extension he is neurovascular intact distally.  Examination of the left hand is very similar.    Images:  X-rays three views left and right hand demonstrate no obvious fracture or dislocation, small foreign body 1st webspace right hand left hand, thumb region.. Images Reviewed and discussed with patient.    Assessment:  1. Bilateral carpal tunnel syndrome  - Ambulatory referral/consult to Orthopedics  - X-Ray Hand Complete Right; Future  - X-Ray Hand Complete Left; Future  - Place in Outpatient; Standing  - Full code; Standing  - Vital signs; Standing  - Insert peripheral IV; Standing  - Clip and Prep Other (please specifiy) (Operative site); Standing  - Cleanse with Chlorhexidine (CHG); Standing  - Diet NPO; Standing  - electrolyte-A infusion  - ceFAZolin (ANCEF) 2 g in  dextrose 5 % (D5W) 50 mL IVPB  - CBC auto differential; Future  - Comprehensive metabolic panel; Future  - EKG 12-lead; Future  - Inpatient consult to Anesthesiology; Standing  - Case Request Operating Room: RELEASE, CARPAL TUNNEL  - Place SUE hose; Standing  - Place sequential compression device; Standing    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 Preop testing  - Place in Outpatient; Standing  - Full code; Standing  - Vital signs; Standing  - Insert peripheral IV; Standing  - Clip and Prep Other (please specifiy) (Operative site); Standing  - Cleanse with Chlorhexidine (CHG); Standing  - Diet NPO; Standing  - electrolyte-A infusion  - ceFAZolin (ANCEF) 2 g in dextrose 5 % (D5W) 50 mL IVPB  - CBC auto differential; Future  - Comprehensive metabolic panel; Future  - EKG 12-lead; Future  - Inpatient consult to Anesthesiology; Standing  - Case Request Operating Room: RELEASE, CARPAL TUNNEL  - Place SUE hose; Standing  - Place sequential compression device; Standing        Plan:  At this time we discussed his physical exam and x-ray findings.  We have discussed a nerve conduction study as well.  Risks benefits and alternatives discussed with the patient in detail.  All questions were answered.  Patient does have a motor deficit of the right hand which is worse.  We have discussed additional conservative treatments well surgical intervention.  He would like to proceed with surgical intervention.  We have discussed the down time rehab process afterwards.  We have discussed no guarantees, we have discussed possible contribution still from his cervical spine.  We will set this up at his convenience, right carpal tunnel release.    Follow UP: No follow-ups on file.

## 2023-09-14 ENCOUNTER — ANESTHESIA EVENT (OUTPATIENT)
Dept: SURGERY | Facility: HOSPITAL | Age: 68
End: 2023-09-14
Payer: MEDICARE

## 2023-09-14 ENCOUNTER — HOSPITAL ENCOUNTER (OUTPATIENT)
Dept: PREADMISSION TESTING | Facility: HOSPITAL | Age: 68
Discharge: HOME | End: 2023-09-14
Attending: ORTHOPAEDIC SURGERY
Payer: MEDICARE

## 2023-09-14 ENCOUNTER — HOSPITAL ENCOUNTER (OUTPATIENT)
Dept: CARDIOLOGY | Facility: HOSPITAL | Age: 68
Discharge: HOME | End: 2023-09-14
Attending: ORTHOPAEDIC SURGERY
Payer: MEDICARE

## 2023-09-14 DIAGNOSIS — Z01.818 PREOP TESTING: ICD-10-CM

## 2023-09-14 DIAGNOSIS — G56.03 BILATERAL CARPAL TUNNEL SYNDROME: ICD-10-CM

## 2023-09-14 PROCEDURE — 93010 ELECTROCARDIOGRAM REPORT: CPT | Mod: ,,, | Performed by: INTERNAL MEDICINE

## 2023-09-14 PROCEDURE — 99900031 HC PATIENT EDUCATION (STAT)

## 2023-09-14 PROCEDURE — 93010 EKG 12-LEAD: ICD-10-PCS | Mod: ,,, | Performed by: INTERNAL MEDICINE

## 2023-09-14 PROCEDURE — 93005 ELECTROCARDIOGRAM TRACING: CPT

## 2023-09-14 RX ORDER — HYDROCODONE BITARTRATE AND ACETAMINOPHEN 5; 325 MG/1; MG/1
1 TABLET ORAL
Status: CANCELLED | OUTPATIENT
Start: 2023-09-14

## 2023-09-14 RX ORDER — MEPERIDINE HYDROCHLORIDE 25 MG/ML
12.5 INJECTION INTRAMUSCULAR; INTRAVENOUS; SUBCUTANEOUS ONCE AS NEEDED
Status: CANCELLED | OUTPATIENT
Start: 2023-09-14 | End: 2023-09-15

## 2023-09-14 RX ORDER — FENTANYL CITRATE 50 UG/ML
25 INJECTION, SOLUTION INTRAMUSCULAR; INTRAVENOUS EVERY 5 MIN PRN
Status: CANCELLED | OUTPATIENT
Start: 2023-09-14

## 2023-09-14 RX ORDER — ONDANSETRON 2 MG/ML
4 INJECTION INTRAMUSCULAR; INTRAVENOUS DAILY PRN
Status: CANCELLED | OUTPATIENT
Start: 2023-09-14

## 2023-09-14 RX ORDER — DIPHENHYDRAMINE HYDROCHLORIDE 50 MG/ML
12.5 INJECTION INTRAMUSCULAR; INTRAVENOUS ONCE AS NEEDED
Status: CANCELLED | OUTPATIENT
Start: 2023-09-14 | End: 2035-02-10

## 2023-09-14 RX ORDER — SODIUM CHLORIDE 9 MG/ML
INJECTION, SOLUTION INTRAVENOUS CONTINUOUS
Status: CANCELLED | OUTPATIENT
Start: 2023-09-14

## 2023-09-14 NOTE — ANESTHESIA PREPROCEDURE EVALUATION
09/14/2023  Nilesh Shane is a 68 y.o., male.      Pre-op Assessment    I have reviewed the Patient Summary Reports.     I have reviewed the Nursing Notes. I have reviewed the NPO Status.   I have reviewed the Medications.     Review of Systems  Anesthesia Hx:  No problems with previous Anesthesia  Denies Family Hx of Anesthesia complications.   Denies Personal Hx of Anesthesia complications.   Social:  Non-Smoker, No Alcohol Use    Hematology/Oncology:  Hematology Normal   Oncology Normal     EENT/Dental:EENT/Dental Normal   Cardiovascular:   Hypertension hyperlipidemia  Hypertension, Essential Hypertension    Pulmonary:   Sleep Apnea  Obstructive Sleep Apnea (COREY).   Renal/:   Chronic Renal Disease  Kidney Function/Disease    Hepatic/GI:   GERD  Hepatic/GI Symptoms: heartburn.    Musculoskeletal:   Right carpal tunnel syndrome Musculoskeletal General/Symptoms: joint pain. Functional capacity is unlimited. Right carpal tunnel syndrome   Neurological:   Neuromuscular Disease, Cervical radiculopathy Neuromuscular Disease   Endocrine:  Diabetes, Type 2 Diabetes    Dermatological:  Skin Normal    Psych:  Psychiatric Normal           Physical Exam  General: Well nourished, Cooperative, Alert and Oriented    Chest/Lungs:  Clear to auscultation, Normal Respiratory Rate    Heart:  Rate: Normal  Rhythm: Regular Rhythm    Abdomen:  Normal, Soft    Musculoskeletal:Right carpal tunnel syndrome      Anesthesia Plan  Type of Anesthesia, risks & benefits discussed:    Anesthesia Type: MAC, Regional  Intra-op Monitoring Plan: Standard ASA Monitors  Post Op Pain Control Plan: multimodal analgesia  Induction:  IV  Informed Consent: Informed consent signed with the Patient and all parties understand the risks and agree with anesthesia plan.  All questions answered. Patient consented to blood products? No  ASA Score: 3  Day  of Surgery Review of History & Physical: H&P Update referred to the surgeon/provider.I have interviewed and examined the patient. I have reviewed the patient's H&P dated: 9/18/23. There are no significant changes.     Ready For Surgery From Anesthesia Perspective.     .

## 2023-09-18 ENCOUNTER — ANESTHESIA (OUTPATIENT)
Dept: SURGERY | Facility: HOSPITAL | Age: 68
End: 2023-09-18
Payer: MEDICARE

## 2023-09-18 ENCOUNTER — HOSPITAL ENCOUNTER (OUTPATIENT)
Facility: HOSPITAL | Age: 68
Discharge: HOME | End: 2023-09-18
Attending: ORTHOPAEDIC SURGERY | Admitting: ORTHOPAEDIC SURGERY
Payer: MEDICARE

## 2023-09-18 DIAGNOSIS — Z01.818 PREOP TESTING: ICD-10-CM

## 2023-09-18 DIAGNOSIS — G56.03 BILATERAL CARPAL TUNNEL SYNDROME: ICD-10-CM

## 2023-09-18 PROCEDURE — 36000706: Performed by: ORTHOPAEDIC SURGERY

## 2023-09-18 PROCEDURE — 63600175 PHARM REV CODE 636 W HCPCS: Performed by: NURSE ANESTHETIST, CERTIFIED REGISTERED

## 2023-09-18 PROCEDURE — 37000008 HC ANESTHESIA 1ST 15 MINUTES: Performed by: ORTHOPAEDIC SURGERY

## 2023-09-18 PROCEDURE — 37000009 HC ANESTHESIA EA ADD 15 MINS: Performed by: ORTHOPAEDIC SURGERY

## 2023-09-18 PROCEDURE — 63600175 PHARM REV CODE 636 W HCPCS: Performed by: ORTHOPAEDIC SURGERY

## 2023-09-18 PROCEDURE — 25000003 PHARM REV CODE 250: Performed by: ORTHOPAEDIC SURGERY

## 2023-09-18 PROCEDURE — 82962 GLUCOSE BLOOD TEST: CPT | Performed by: ORTHOPAEDIC SURGERY

## 2023-09-18 PROCEDURE — 64721 PR REVISE MEDIAN N/CARPAL TUNNEL SURG: ICD-10-PCS | Mod: RT,,, | Performed by: ORTHOPAEDIC SURGERY

## 2023-09-18 PROCEDURE — 36000707: Performed by: ORTHOPAEDIC SURGERY

## 2023-09-18 PROCEDURE — 25000003 PHARM REV CODE 250: Performed by: NURSE ANESTHETIST, CERTIFIED REGISTERED

## 2023-09-18 PROCEDURE — 71000015 HC POSTOP RECOV 1ST HR: Performed by: ORTHOPAEDIC SURGERY

## 2023-09-18 PROCEDURE — D9220A PRA ANESTHESIA: ICD-10-PCS | Mod: ,,, | Performed by: NURSE ANESTHETIST, CERTIFIED REGISTERED

## 2023-09-18 PROCEDURE — D9220A PRA ANESTHESIA: Mod: ,,, | Performed by: NURSE ANESTHETIST, CERTIFIED REGISTERED

## 2023-09-18 PROCEDURE — 64721 CARPAL TUNNEL SURGERY: CPT | Mod: RT,,, | Performed by: ORTHOPAEDIC SURGERY

## 2023-09-18 RX ORDER — SODIUM CHLORIDE 9 MG/ML
INJECTION, SOLUTION INTRAVENOUS CONTINUOUS
Status: DISCONTINUED | OUTPATIENT
Start: 2023-09-18 | End: 2023-09-18 | Stop reason: HOSPADM

## 2023-09-18 RX ORDER — ONDANSETRON 2 MG/ML
4 INJECTION INTRAMUSCULAR; INTRAVENOUS EVERY 6 HOURS PRN
Status: DISCONTINUED | OUTPATIENT
Start: 2023-09-18 | End: 2023-09-18 | Stop reason: HOSPADM

## 2023-09-18 RX ORDER — MIDAZOLAM HYDROCHLORIDE 1 MG/ML
2 INJECTION INTRAMUSCULAR; INTRAVENOUS
Status: ACTIVE | OUTPATIENT
Start: 2023-09-18 | End: 2023-09-18

## 2023-09-18 RX ORDER — METHOCARBAMOL 500 MG/1
500 TABLET, FILM COATED ORAL EVERY 6 HOURS PRN
Status: DISCONTINUED | OUTPATIENT
Start: 2023-09-18 | End: 2023-09-18 | Stop reason: HOSPADM

## 2023-09-18 RX ORDER — ROPIVACAINE HYDROCHLORIDE 5 MG/ML
INJECTION, SOLUTION EPIDURAL; INFILTRATION; PERINEURAL
Status: COMPLETED | OUTPATIENT
Start: 2023-09-18 | End: 2023-09-18

## 2023-09-18 RX ORDER — HYDROCODONE BITARTRATE AND ACETAMINOPHEN 5; 325 MG/1; MG/1
1 TABLET ORAL EVERY 4 HOURS PRN
Status: DISCONTINUED | OUTPATIENT
Start: 2023-09-18 | End: 2023-09-18 | Stop reason: HOSPADM

## 2023-09-18 RX ORDER — PROPOFOL 10 MG/ML
VIAL (ML) INTRAVENOUS
Status: DISCONTINUED | OUTPATIENT
Start: 2023-09-18 | End: 2023-09-18

## 2023-09-18 RX ORDER — MAG HYDROX/ALUMINUM HYD/SIMETH 200-200-20
30 SUSPENSION, ORAL (FINAL DOSE FORM) ORAL EVERY 6 HOURS PRN
Status: DISCONTINUED | OUTPATIENT
Start: 2023-09-18 | End: 2023-09-18 | Stop reason: HOSPADM

## 2023-09-18 RX ORDER — MORPHINE SULFATE 4 MG/ML
4 INJECTION, SOLUTION INTRAMUSCULAR; INTRAVENOUS
Status: DISCONTINUED | OUTPATIENT
Start: 2023-09-18 | End: 2023-09-18 | Stop reason: HOSPADM

## 2023-09-18 RX ORDER — CALCIUM CARBONATE 200(500)MG
500 TABLET,CHEWABLE ORAL 3 TIMES DAILY PRN
Status: DISCONTINUED | OUTPATIENT
Start: 2023-09-18 | End: 2023-09-18 | Stop reason: HOSPADM

## 2023-09-18 RX ORDER — FENTANYL CITRATE 50 UG/ML
25 INJECTION, SOLUTION INTRAMUSCULAR; INTRAVENOUS
Status: DISCONTINUED | OUTPATIENT
Start: 2023-09-18 | End: 2023-09-18 | Stop reason: SDUPTHER

## 2023-09-18 RX ORDER — LIDOCAINE HYDROCHLORIDE 20 MG/ML
INJECTION INTRAVENOUS
Status: DISCONTINUED | OUTPATIENT
Start: 2023-09-18 | End: 2023-09-18

## 2023-09-18 RX ORDER — SODIUM CHLORIDE 9 MG/ML
INJECTION, SOLUTION INTRAVENOUS CONTINUOUS
Status: DISCONTINUED | OUTPATIENT
Start: 2023-09-18 | End: 2024-01-29

## 2023-09-18 RX ORDER — MIDAZOLAM HYDROCHLORIDE 1 MG/ML
1 INJECTION INTRAMUSCULAR; INTRAVENOUS
Status: ACTIVE | OUTPATIENT
Start: 2023-09-18 | End: 2023-09-18

## 2023-09-18 RX ORDER — METOCLOPRAMIDE HYDROCHLORIDE 5 MG/ML
10 INJECTION INTRAMUSCULAR; INTRAVENOUS EVERY 6 HOURS PRN
Status: DISCONTINUED | OUTPATIENT
Start: 2023-09-18 | End: 2023-09-18 | Stop reason: HOSPADM

## 2023-09-18 RX ORDER — ROPIVACAINE HYDROCHLORIDE 5 MG/ML
30 INJECTION, SOLUTION EPIDURAL; INFILTRATION; PERINEURAL ONCE
Status: COMPLETED | OUTPATIENT
Start: 2023-09-18 | End: 2023-09-18

## 2023-09-18 RX ORDER — HYDROCODONE BITARTRATE AND ACETAMINOPHEN 5; 325 MG/1; MG/1
1 TABLET ORAL EVERY 6 HOURS PRN
Qty: 12 TABLET | Refills: 0 | Status: SHIPPED | OUTPATIENT
Start: 2023-09-18 | End: 2024-01-29

## 2023-09-18 RX ORDER — SODIUM CHLORIDE, SODIUM GLUCONATE, SODIUM ACETATE, POTASSIUM CHLORIDE AND MAGNESIUM CHLORIDE 30; 37; 368; 526; 502 MG/100ML; MG/100ML; MG/100ML; MG/100ML; MG/100ML
INJECTION, SOLUTION INTRAVENOUS CONTINUOUS
Status: DISCONTINUED | OUTPATIENT
Start: 2023-09-18 | End: 2023-09-18 | Stop reason: HOSPADM

## 2023-09-18 RX ORDER — FENTANYL CITRATE 50 UG/ML
50 INJECTION, SOLUTION INTRAMUSCULAR; INTRAVENOUS
Status: DISCONTINUED | OUTPATIENT
Start: 2023-09-18 | End: 2024-01-29

## 2023-09-18 RX ADMIN — SODIUM CHLORIDE: 9 INJECTION, SOLUTION INTRAVENOUS at 02:09

## 2023-09-18 RX ADMIN — ROPIVACAINE HYDROCHLORIDE 30 ML: 5 INJECTION, SOLUTION EPIDURAL; INFILTRATION; PERINEURAL at 01:09

## 2023-09-18 RX ADMIN — CEFAZOLIN 2 G: 2 INJECTION, POWDER, FOR SOLUTION INTRAMUSCULAR; INTRAVENOUS at 02:09

## 2023-09-18 RX ADMIN — PROPOFOL 30 MG: 10 INJECTION, EMULSION INTRAVENOUS at 02:09

## 2023-09-18 RX ADMIN — ROPIVACAINE HYDROCHLORIDE 30 ML: 5 INJECTION EPIDURAL; INFILTRATION; PERINEURAL at 01:09

## 2023-09-18 RX ADMIN — MIDAZOLAM 1 MG: 1 INJECTION INTRAMUSCULAR; INTRAVENOUS at 02:09

## 2023-09-18 RX ADMIN — MIDAZOLAM 1 MG: 1 INJECTION INTRAMUSCULAR; INTRAVENOUS at 01:09

## 2023-09-18 RX ADMIN — PROPOFOL 50 MG: 10 INJECTION, EMULSION INTRAVENOUS at 02:09

## 2023-09-18 RX ADMIN — LIDOCAINE HYDROCHLORIDE 50 MG: 20 INJECTION, SOLUTION INTRAVENOUS at 02:09

## 2023-09-18 RX ADMIN — MIDAZOLAM 1 MG: 1 INJECTION INTRAMUSCULAR; INTRAVENOUS at 12:09

## 2023-09-18 NOTE — ANESTHESIA POSTPROCEDURE EVALUATION
Anesthesia Post Evaluation    Patient: Nilesh Shane    Procedure(s) Performed: Procedure(s) (LRB):  RELEASE, CARPAL TUNNEL (Right)    Final Anesthesia Type: regional      Patient location during evaluation: OPS  Patient participation: Yes- Able to Participate  Level of consciousness: awake and alert and oriented  Post-procedure vital signs: reviewed and stable  Pain management: adequate  Airway patency: patent    PONV status at discharge: No PONV  Anesthetic complications: no      Cardiovascular status: stable  Respiratory status: unassisted, room air and spontaneous ventilation  Hydration status: euvolemic  Follow-up not needed.                                              No case tracking events are documented in the log.      Pain/Mynor Score: No data recorded

## 2023-09-18 NOTE — BRIEF OP NOTE
[facility]  Brief Operative Note    Surgery Date: 9/18/2023     Surgeon(s) and Role:     * Carlos Martinez MD - Primary    Assisting Surgeon: None    Pre-op Diagnosis:  Bilateral carpal tunnel syndrome [G56.03]  Preop testing [Z01.818]    Post-op Diagnosis:  Post-Op Diagnosis Codes:     * Bilateral carpal tunnel syndrome [G56.03]     * Preop testing [Z01.818]    Procedure(s) (LRB):  RELEASE, CARPAL TUNNEL (Right)    Anesthesia: General/Regional    Operative Findings: R cts    Estimated Blood Loss: * No values recorded between 9/18/2023  2:55 PM and 9/18/2023  3:12 PM *         Specimens:   Specimen (24h ago, onward)      None              Discharge Note    OUTCOME: Patient tolerated treatment/procedure well without complication and is now ready for discharge.    DISPOSITION: Home or Self Care    FINAL DIAGNOSIS:  Bilateral carpal tunnel syndrome    FOLLOWUP: In clinic    DISCHARGE INSTRUCTIONS:    Discharge Procedure Orders   Diet general     Activity as tolerated     Keep surgical extremity elevated     Ice to affected area     Lifting restrictions     No driving, operating heavy equipment or signing legal documents while taking pain medication.     Other restrictions (specify):   Order Comments: Okay to wean sling as tolerated.     Remove dressing in 72 hours     Wound care routine (specify)   Order Comments: Wound care routine: keep dressing clean, dry, and intact. Ok to remove in 3 days. Ok to shower once removed. No submersion.     Call MD for:  temperature >100.4     Call MD for:  persistent nausea and vomiting     Call MD for:  severe uncontrolled pain     Call MD for:  difficulty breathing, headache or visual disturbances     Call MD for:  redness, tenderness, or signs of infection (pain, swelling, redness, odor or green/yellow discharge around incision site)     Call MD for:  hives     Call MD for:  persistent dizziness or light-headedness     Call MD for:  extreme fatigue      Shower on day dressing removed (No bath)        Clinical Reference Documents Added to Patient Instructions         Document    HOW TO PREVENT SURGICAL SITE INFECTIONS (ENGLISH)    HYDROCODONE AND ACETAMINOPHEN, ADULT (ENGLISH)

## 2023-09-18 NOTE — ANESTHESIA PROCEDURE NOTES
Peripheral Block    Patient location during procedure: holding area    Reason for block: primary anesthetic    Diagnosis: Right carpal tunnel syndrome   Start time: 9/18/2023 1:01 PM  Timeout: 9/18/2023 12:56 PM   End time: 9/18/2023 1:05 PM    Staffing  Authorizing Provider: Collins Ramirez CRNA  Performing Provider: Collins Ramirez CRNA    Staffing  Performed by: Collins Ramirez CRNA  Authorized by: Collins Ramirez CRNA    Preanesthetic Checklist  Completed: patient identified, IV checked, site marked, risks and benefits discussed, surgical consent, monitors and equipment checked, pre-op evaluation and timeout performed  Peripheral Block  Patient position: supine  Prep: ChloraPrep and site prepped and draped  Patient monitoring: heart rate, cardiac monitor, continuous pulse ox, continuous capnometry and frequent blood pressure checks  Block type: supraclavicular  Laterality: right  Injection technique: single shot  Needle  Needle type: Stimuplex   Needle gauge: 20 G  Needle localization: anatomical landmarks, nerve stimulator, paresthesias and ultrasound guidance  Catheter type: stimulating  Catheter size: 20 G     Assessment  Injection assessment: negative aspiration, negative parasthesia and local visualized surrounding nerve  Paresthesia pain: none  Heart rate change: no  Slow fractionated injection: yes  Pain Tolerance: comfortable throughout block and no complaints  Medications:    Medications: ropivacaine (NAROPIN) injection 0.5% - Perineural   30 mL - 9/18/2023 1:05:00 PM

## 2023-09-18 NOTE — H&P
Admission History & Physical    Subjective:    CC: No chief complaint on file.       HPI:  Nilesh Shane presents today for preoperative evaluation for carpal tunnel release right wrist. I reviewed the indications for surgery. The risks and benefits of the proposed and alternative treatments were discussed with the patient. Questions pertinent to the procedure were solicited and answered. Dr. Martinez was available to answer any questions with instruction to call clinic with any further questions.No assurances were given. Informed consent was obtained. The patient expressed good understanding and wished to proceed with scheduling the procedure.     ROS:   Constitutional: No fever, weakness, or fatigue.   Ear/Nose/Mouth/Throat: No nasal congestion or sore throat.   Respiratory: No shortness of breath or cough.   Cardiovascular: No chest pain, palpitations, or peripheral edema.   Gastrointestinal: No nausea, vomiting, or abdominal pain.   Genitourinary: No dysuria.  Musculoskeletal:  Right hand numbness and tingling    Past Surgical History:   Procedure Laterality Date    COLON SURGERY      ILEOSTOMY      ILEOSTOMY CLOSURE          Past Medical History:   Diagnosis Date    Diabetes mellitus     Hypertension         Objective:    There were no vitals filed for this visit.     Physical Exam:    Appearance: No distress, good color on room air. Alert and cooperative.  HEENT: Normocephalic. PERRLA EOM intact.   Lungs: Breathing unlabored.  Heart: Regular rate and rhythm.  Abdomen: Soft, non-tender.  No rebound tenderness.  Extremities:  Examination of the bilateral upper extremities compartment soft and warm.  Skin is intact.  There is no signs symptoms of DVT or infection.  Examination of the right hand has questionable Tinel's positive Phalen's.  There is no gross thenar hypothenar wasting.  He is difficulty making a full fist has full extension he is neurovascular intact distally.  Examination of the left hand is very  similar.  Skin: No rashes or open wounds.        Assessment:  Right wrist carpal tunnel syndrome    Plan:  Plan for carpal tunnel release right wrist at [facility]. The patient has been given preoperative instructions and prescriptions for post-operative medication. Post-operative appointment is scheduled for 2 weeks.

## 2023-09-18 NOTE — TRANSFER OF CARE
"Anesthesia Transfer of Care Note    Patient: Nilesh Shane    Procedure(s) Performed: Procedure(s) (LRB):  RELEASE, CARPAL TUNNEL (Right)    Patient location: OPS    Anesthesia Type: regional and MAC    Transport from OR: Transported from OR on room air with adequate spontaneous ventilation    Post pain: adequate analgesia    Post assessment: no apparent anesthetic complications    Post vital signs: stable    Level of consciousness: awake, alert and oriented    Nausea/Vomiting: no nausea/vomiting    Complications: none    Transfer of care protocol was followedComments: /79  HR 56  RR 16  O2 Sat 95  Temp 36.4      Last vitals:   Visit Vitals  BP (!) 151/83   Pulse (!) 50   Temp 36.3 °C (97.3 °F) (Tympanic)   Resp 17   Ht 5' 5" (1.651 m)   Wt 100.7 kg (222 lb 0.1 oz)   SpO2 100%   BMI 36.94 kg/m²     "

## 2023-09-19 VITALS
HEIGHT: 65 IN | SYSTOLIC BLOOD PRESSURE: 150 MMHG | BODY MASS INDEX: 36.99 KG/M2 | DIASTOLIC BLOOD PRESSURE: 68 MMHG | OXYGEN SATURATION: 100 % | RESPIRATION RATE: 18 BRPM | HEART RATE: 62 BPM | WEIGHT: 222 LBS | TEMPERATURE: 97 F

## 2023-09-19 LAB — POCT GLUCOSE: 119 MG/DL (ref 70–110)

## 2023-09-19 NOTE — OP NOTE
DATE OF SURGERY: 9/18/2023    SURGEON: Carlos Martinez MD      HOSPITAL:  [facility]    PREOPERATIVE DIAGNOSES: Carpal tunnel syndrome, right wrist.     POSTOPERATIVE DIAGNOSES: Same as above    PROCEDURES: Carpal tunnel release, right wrist.    ANESTHESIA: Axillary block, IV sedation.    IV FLUIDS: As per Anesthesia.    ESTIMATED BLOOD LOSS: Less than 5 cc.    COUNTS: Correct.    COMPLICATIONS: None.    CONDITION: Stable to PACU.    INDICATIONS FOR PROCEDURE: Nilesh Shane is a 68 y.o.  year old male who has been followed in my clinic.  The patient has severe carpal tunnel and has failed conservative treatment.  A nerve conduction study was performed demonstrating the above findings.  The risks, benefits, and alternatives were discussed with the patient in detail.  All questions were answered.  Informed consent was obtained.    PROCEDURE IN DETAIL: The patient was found in the preoperative holding by Anesthesia and found fit for surgery.  The patient was taken to the operating room and placed on the operating table in supine position.  All bony prominences were well padded.  Time-out was called to identify correct patient, correct procedure, correct site, all were in agreement.  The patient underwent IV sedation.  The patient was then prepped and draped in normal sterile fashion, leaving the right upper extremity exposed for surgery.  A well-padded tourniquet was placed on the right upper arm.  Approximate tourniquet time was 7 minutes at 250.  The patient received preoperative antibiotics.  After exsanguination of the affected upper extremity, tourniquet was inflated.  A 2.5 cm incision was made over the volar aspect of the right wrist in line with the radial aspect of the fourth finger.  Soft tissue dissection was carried down to the transverse carpal ligament where it was then incised.  The median nerve was completely flattened in the carpal tunnel.  After complete release of carpal tunnel, tourniquet was  released.  Hemostasis achieved.  Copious irrigation was used to wash the wound.  The incision was then closed with 3-0 nylon suture in standard fashion.  Xeroform, 4 x 4's, soft tissue dressing, Ace wrap, and a sling was placed on the affected upper extremity.  The patient was then awoken by Anesthesia and brought to PACU in stable condition.        ______________________________  Carlos Martinez MD

## 2023-10-02 ENCOUNTER — OFFICE VISIT (OUTPATIENT)
Dept: ORTHOPEDICS | Facility: CLINIC | Age: 68
End: 2023-10-02
Payer: MEDICARE

## 2023-10-02 VITALS
HEIGHT: 65 IN | BODY MASS INDEX: 36.65 KG/M2 | SYSTOLIC BLOOD PRESSURE: 155 MMHG | WEIGHT: 220 LBS | HEART RATE: 67 BPM | DIASTOLIC BLOOD PRESSURE: 79 MMHG

## 2023-10-02 DIAGNOSIS — G56.03 BILATERAL CARPAL TUNNEL SYNDROME: Primary | ICD-10-CM

## 2023-10-02 PROCEDURE — 1101F PT FALLS ASSESS-DOCD LE1/YR: CPT | Mod: CPTII,,,

## 2023-10-02 PROCEDURE — 3008F PR BODY MASS INDEX (BMI) DOCUMENTED: ICD-10-PCS | Mod: CPTII,,,

## 2023-10-02 PROCEDURE — 1160F PR REVIEW ALL MEDS BY PRESCRIBER/CLIN PHARMACIST DOCUMENTED: ICD-10-PCS | Mod: CPTII,,,

## 2023-10-02 PROCEDURE — 3288F PR FALLS RISK ASSESSMENT DOCUMENTED: ICD-10-PCS | Mod: CPTII,,,

## 2023-10-02 PROCEDURE — 3077F PR MOST RECENT SYSTOLIC BLOOD PRESSURE >= 140 MM HG: ICD-10-PCS | Mod: CPTII,,,

## 2023-10-02 PROCEDURE — 1101F PR PT FALLS ASSESS DOC 0-1 FALLS W/OUT INJ PAST YR: ICD-10-PCS | Mod: CPTII,,,

## 2023-10-02 PROCEDURE — 4010F PR ACE/ARB THEARPY RXD/TAKEN: ICD-10-PCS | Mod: CPTII,,,

## 2023-10-02 PROCEDURE — 1125F PR PAIN SEVERITY QUANTIFIED, PAIN PRESENT: ICD-10-PCS | Mod: CPTII,,,

## 2023-10-02 PROCEDURE — 1125F AMNT PAIN NOTED PAIN PRSNT: CPT | Mod: CPTII,,,

## 2023-10-02 PROCEDURE — 4010F ACE/ARB THERAPY RXD/TAKEN: CPT | Mod: CPTII,,,

## 2023-10-02 PROCEDURE — 99024 PR POST-OP FOLLOW-UP VISIT: ICD-10-PCS | Mod: ,,,

## 2023-10-02 PROCEDURE — 3078F PR MOST RECENT DIASTOLIC BLOOD PRESSURE < 80 MM HG: ICD-10-PCS | Mod: CPTII,,,

## 2023-10-02 PROCEDURE — 3288F FALL RISK ASSESSMENT DOCD: CPT | Mod: CPTII,,,

## 2023-10-02 PROCEDURE — 3078F DIAST BP <80 MM HG: CPT | Mod: CPTII,,,

## 2023-10-02 PROCEDURE — 3077F SYST BP >= 140 MM HG: CPT | Mod: CPTII,,,

## 2023-10-02 PROCEDURE — 1159F MED LIST DOCD IN RCRD: CPT | Mod: CPTII,,,

## 2023-10-02 PROCEDURE — 3044F PR MOST RECENT HEMOGLOBIN A1C LEVEL <7.0%: ICD-10-PCS | Mod: CPTII,,,

## 2023-10-02 PROCEDURE — 3008F BODY MASS INDEX DOCD: CPT | Mod: CPTII,,,

## 2023-10-02 PROCEDURE — 1159F PR MEDICATION LIST DOCUMENTED IN MEDICAL RECORD: ICD-10-PCS | Mod: CPTII,,,

## 2023-10-02 PROCEDURE — 3044F HG A1C LEVEL LT 7.0%: CPT | Mod: CPTII,,,

## 2023-10-02 PROCEDURE — 1160F RVW MEDS BY RX/DR IN RCRD: CPT | Mod: CPTII,,,

## 2023-10-02 PROCEDURE — 99024 POSTOP FOLLOW-UP VISIT: CPT | Mod: ,,,

## 2023-10-02 RX ORDER — CEPHALEXIN 500 MG/1
500 CAPSULE ORAL EVERY 12 HOURS
Qty: 14 CAPSULE | Refills: 0 | Status: SHIPPED | OUTPATIENT
Start: 2023-10-02 | End: 2023-10-09

## 2023-10-02 NOTE — PROGRESS NOTES
Subjective:    CC: Post-op Evaluation of the Right Hand (Post op Rt CTS sx 9/18/23-12/16/23 pt states ROM has improved.Taking ibuprofen for pain. )       HPI:  The patient returns to clinic for follow up of a right carpal tunnel release on 09/18/2023.  He is 2 weeks out. The patient states no pain; occasional ibuprofen when needed.  No sick significant amount of improvement in range of motion.  The patient's complaints of numbness are resolved in the right hand.  No New complaints.    ROS: Refer to HPI for pertinent ROS. All other 12 point systems negative.    Objective:    Vitals:    10/02/23 1440   BP: (!) 155/79   Pulse: 67        Physical Exam:  Right upper extremity compartments are soft and warm.  There are no signs or symptoms of DVT or infection. The patients incision is well healed without drainage or fluctuance.  He does have an area of exudate built up about the proximal aspect of his incision.  There is surrounding erythema.  Nontender to palpation.  Sutures have been removed. Able to make a full fist and has full extension of fingers.   Neurovascularly intact distally.    Images:  Previous Images Reviewed and discussed with patient.    Assessment:  1. Bilateral carpal tunnel syndrome  - cephALEXin (KEFLEX) 500 MG capsule; Take 1 capsule (500 mg total) by mouth every 12 (twelve) hours. for 7 days  Dispense: 14 capsule; Refill: 0       Plan:  Physical exam and intraoperative findings discussed with the patient. Wound care instructions given.  Patient is doing very well postoperatively.  Antibiotics prescribed as an over precaution.  Infection precautions given with instructions to call clinic.  The Patient was instructed to take OTC pain medication as needed with appropriate precautions. I would like to see the patient back in 4 weeks to assess his progress.  Patient states that his left hand is doing very well and would like to hold off on surgical intervention at this time.    Follow up: Follow up in  about 4 weeks (around 10/30/2023).

## 2023-10-30 ENCOUNTER — OFFICE VISIT (OUTPATIENT)
Dept: ORTHOPEDICS | Facility: CLINIC | Age: 68
End: 2023-10-30
Payer: MEDICARE

## 2023-10-30 VITALS — WEIGHT: 220 LBS | HEIGHT: 65 IN | BODY MASS INDEX: 36.65 KG/M2

## 2023-10-30 DIAGNOSIS — G56.03 BILATERAL CARPAL TUNNEL SYNDROME: Primary | ICD-10-CM

## 2023-10-30 PROCEDURE — 3288F FALL RISK ASSESSMENT DOCD: CPT | Mod: CPTII,,, | Performed by: ORTHOPAEDIC SURGERY

## 2023-10-30 PROCEDURE — 1160F PR REVIEW ALL MEDS BY PRESCRIBER/CLIN PHARMACIST DOCUMENTED: ICD-10-PCS | Mod: CPTII,,, | Performed by: ORTHOPAEDIC SURGERY

## 2023-10-30 PROCEDURE — 99024 PR POST-OP FOLLOW-UP VISIT: ICD-10-PCS | Mod: ,,, | Performed by: ORTHOPAEDIC SURGERY

## 2023-10-30 PROCEDURE — 1101F PR PT FALLS ASSESS DOC 0-1 FALLS W/OUT INJ PAST YR: ICD-10-PCS | Mod: CPTII,,, | Performed by: ORTHOPAEDIC SURGERY

## 2023-10-30 PROCEDURE — 4010F ACE/ARB THERAPY RXD/TAKEN: CPT | Mod: CPTII,,, | Performed by: ORTHOPAEDIC SURGERY

## 2023-10-30 PROCEDURE — 1159F MED LIST DOCD IN RCRD: CPT | Mod: CPTII,,, | Performed by: ORTHOPAEDIC SURGERY

## 2023-10-30 PROCEDURE — 1159F PR MEDICATION LIST DOCUMENTED IN MEDICAL RECORD: ICD-10-PCS | Mod: CPTII,,, | Performed by: ORTHOPAEDIC SURGERY

## 2023-10-30 PROCEDURE — 3288F PR FALLS RISK ASSESSMENT DOCUMENTED: ICD-10-PCS | Mod: CPTII,,, | Performed by: ORTHOPAEDIC SURGERY

## 2023-10-30 PROCEDURE — 99024 POSTOP FOLLOW-UP VISIT: CPT | Mod: ,,, | Performed by: ORTHOPAEDIC SURGERY

## 2023-10-30 PROCEDURE — 3044F PR MOST RECENT HEMOGLOBIN A1C LEVEL <7.0%: ICD-10-PCS | Mod: CPTII,,, | Performed by: ORTHOPAEDIC SURGERY

## 2023-10-30 PROCEDURE — 1101F PT FALLS ASSESS-DOCD LE1/YR: CPT | Mod: CPTII,,, | Performed by: ORTHOPAEDIC SURGERY

## 2023-10-30 PROCEDURE — 4010F PR ACE/ARB THEARPY RXD/TAKEN: ICD-10-PCS | Mod: CPTII,,, | Performed by: ORTHOPAEDIC SURGERY

## 2023-10-30 PROCEDURE — 1160F RVW MEDS BY RX/DR IN RCRD: CPT | Mod: CPTII,,, | Performed by: ORTHOPAEDIC SURGERY

## 2023-10-30 PROCEDURE — 3044F HG A1C LEVEL LT 7.0%: CPT | Mod: CPTII,,, | Performed by: ORTHOPAEDIC SURGERY

## 2023-10-30 NOTE — PROGRESS NOTES
"Subjective:    CC: Follow-up of the Right Wrist (R carpal tunnel 9/18/23-12/16/23  - pt states that his hand is feeling great. Denies any numbness and tingling. )       HPI:  Today for repeat exam.  Patient is 6 weeks from his right carpal tunnel release.  Patient states he is doing very well, he is able to sleep at night.  He is very happy with his results.  He is considering his left carpal tunnel in the spring.    ROS: Refer to HPI for pertinent ROS. All other 12 point systems negative.    Objective:  Vitals:    10/30/23 1313   Weight: 99.8 kg (220 lb)   Height: 5' 5" (1.651 m)        Physical Exam:  Right upper extremity compartment soft and warm.  Skin is intact.  There is no signs symptoms of DVT or infection.  His incision is well healed he is able make a full fist has full extension denies any numbness or tingling, neurovascular intact distally.    Images: . Images Reviewed and discussed with patient.    Assessment:  1. Bilateral carpal tunnel syndrome        Plan:  At this time we discussed his physical exam and intraoperative findings.  He is healed nicely he will continue weightbear as tolerated, I would like see back with any problems or difficulties.    Follow UP: No follow-ups on file.              "

## 2024-02-19 ENCOUNTER — LAB VISIT (OUTPATIENT)
Dept: LAB | Facility: HOSPITAL | Age: 69
End: 2024-02-19
Attending: FAMILY MEDICINE
Payer: MEDICARE

## 2024-02-19 DIAGNOSIS — E78.5 HYPERLIPIDEMIA, UNSPECIFIED HYPERLIPIDEMIA TYPE: ICD-10-CM

## 2024-02-19 DIAGNOSIS — N18.31 CHRONIC KIDNEY DISEASE, STAGE 3A: ICD-10-CM

## 2024-02-19 DIAGNOSIS — E55.9 VITAMIN D DEFICIENCY: ICD-10-CM

## 2024-02-19 DIAGNOSIS — N32.81 OAB (OVERACTIVE BLADDER): ICD-10-CM

## 2024-02-19 DIAGNOSIS — Z12.5 SCREENING PSA (PROSTATE SPECIFIC ANTIGEN): ICD-10-CM

## 2024-02-19 DIAGNOSIS — E53.8 VITAMIN B12 DEFICIENCY: ICD-10-CM

## 2024-02-19 DIAGNOSIS — E03.9 HYPOTHYROIDISM, UNSPECIFIED TYPE: ICD-10-CM

## 2024-02-19 DIAGNOSIS — I10 PRIMARY HYPERTENSION: ICD-10-CM

## 2024-02-19 DIAGNOSIS — E11.65 TYPE 2 DIABETES MELLITUS WITH HYPERGLYCEMIA, WITHOUT LONG-TERM CURRENT USE OF INSULIN: Chronic | ICD-10-CM

## 2024-02-19 LAB
ALBUMIN SERPL-MCNC: 3.9 G/DL (ref 3.4–4.8)
ALBUMIN/GLOB SERPL: 1.1 RATIO (ref 1.1–2)
ALP SERPL-CCNC: 119 UNIT/L (ref 40–150)
ALT SERPL-CCNC: 18 UNIT/L (ref 0–55)
APPEARANCE UR: CLEAR
AST SERPL-CCNC: 19 UNIT/L (ref 5–34)
BACTERIA #/AREA URNS AUTO: NORMAL /HPF
BASOPHILS # BLD AUTO: 0.04 X10(3)/MCL
BASOPHILS NFR BLD AUTO: 0.5 %
BILIRUB SERPL-MCNC: 0.8 MG/DL
BILIRUB UR QL STRIP.AUTO: NEGATIVE
BUN SERPL-MCNC: 21.4 MG/DL (ref 8.4–25.7)
CALCIUM SERPL-MCNC: 9.6 MG/DL (ref 8.8–10)
CHLORIDE SERPL-SCNC: 106 MMOL/L (ref 98–107)
CHOLEST SERPL-MCNC: 165 MG/DL
CHOLEST/HDLC SERPL: 4 {RATIO} (ref 0–5)
CO2 SERPL-SCNC: 28 MMOL/L (ref 23–31)
COLOR UR AUTO: YELLOW
CREAT SERPL-MCNC: 1.28 MG/DL (ref 0.73–1.18)
DEPRECATED CALCIDIOL+CALCIFEROL SERPL-MC: 31.3 NG/ML (ref 30–80)
EOSINOPHIL # BLD AUTO: 0.24 X10(3)/MCL (ref 0–0.9)
EOSINOPHIL NFR BLD AUTO: 3 %
ERYTHROCYTE [DISTWIDTH] IN BLOOD BY AUTOMATED COUNT: 13 % (ref 11.5–17)
EST. AVERAGE GLUCOSE BLD GHB EST-MCNC: 128.4 MG/DL
GFR SERPLBLD CREATININE-BSD FMLA CKD-EPI: >60 MLS/MIN/1.73/M2
GLOBULIN SER-MCNC: 3.7 GM/DL (ref 2.4–3.5)
GLUCOSE SERPL-MCNC: 89 MG/DL (ref 82–115)
GLUCOSE UR QL STRIP.AUTO: NEGATIVE
HBA1C MFR BLD: 6.1 %
HCT VFR BLD AUTO: 49.1 % (ref 42–52)
HDLC SERPL-MCNC: 39 MG/DL (ref 35–60)
HGB BLD-MCNC: 15.7 G/DL (ref 14–18)
IMM GRANULOCYTES # BLD AUTO: 0.01 X10(3)/MCL (ref 0–0.04)
IMM GRANULOCYTES NFR BLD AUTO: 0.1 %
KETONES UR QL STRIP.AUTO: NEGATIVE
LDLC SERPL CALC-MCNC: 103 MG/DL (ref 50–140)
LEUKOCYTE ESTERASE UR QL STRIP.AUTO: NEGATIVE
LYMPHOCYTES # BLD AUTO: 1.79 X10(3)/MCL (ref 0.6–4.6)
LYMPHOCYTES NFR BLD AUTO: 22 %
MCH RBC QN AUTO: 30.1 PG (ref 27–31)
MCHC RBC AUTO-ENTMCNC: 32 G/DL (ref 33–36)
MCV RBC AUTO: 94.1 FL (ref 80–94)
MONOCYTES # BLD AUTO: 0.6 X10(3)/MCL (ref 0.1–1.3)
MONOCYTES NFR BLD AUTO: 7.4 %
NEUTROPHILS # BLD AUTO: 5.45 X10(3)/MCL (ref 2.1–9.2)
NEUTROPHILS NFR BLD AUTO: 67 %
NITRITE UR QL STRIP.AUTO: NEGATIVE
PH UR STRIP.AUTO: 7 [PH]
PLATELET # BLD AUTO: 210 X10(3)/MCL (ref 130–400)
PMV BLD AUTO: 11.8 FL (ref 7.4–10.4)
POTASSIUM SERPL-SCNC: 3.9 MMOL/L (ref 3.5–5.1)
PROT SERPL-MCNC: 7.6 GM/DL (ref 5.8–7.6)
PROT UR QL STRIP.AUTO: ABNORMAL
PSA SERPL-MCNC: 0.73 NG/ML
RBC # BLD AUTO: 5.22 X10(6)/MCL (ref 4.7–6.1)
RBC #/AREA URNS AUTO: NORMAL /HPF
RBC UR QL AUTO: NEGATIVE
SODIUM SERPL-SCNC: 144 MMOL/L (ref 136–145)
SP GR UR STRIP.AUTO: 1.02 (ref 1–1.03)
SQUAMOUS #/AREA URNS AUTO: NORMAL /HPF
T3FREE SERPL-MCNC: 2.74 PG/ML (ref 1.58–3.91)
T4 FREE SERPL-MCNC: 0.97 NG/DL (ref 0.7–1.48)
TRIGL SERPL-MCNC: 114 MG/DL (ref 34–140)
TSH SERPL-ACNC: 1.61 UIU/ML (ref 0.35–4.94)
UROBILINOGEN UR STRIP-ACNC: 0.2
VIT B12 SERPL-MCNC: 333 PG/ML (ref 213–816)
VLDLC SERPL CALC-MCNC: 23 MG/DL
WBC # SPEC AUTO: 8.13 X10(3)/MCL (ref 4.5–11.5)
WBC #/AREA URNS AUTO: NORMAL /HPF

## 2024-02-19 PROCEDURE — 80061 LIPID PANEL: CPT

## 2024-02-19 PROCEDURE — 82306 VITAMIN D 25 HYDROXY: CPT

## 2024-02-19 PROCEDURE — 80053 COMPREHEN METABOLIC PANEL: CPT

## 2024-02-19 PROCEDURE — 84443 ASSAY THYROID STIM HORMONE: CPT

## 2024-02-19 PROCEDURE — 83036 HEMOGLOBIN GLYCOSYLATED A1C: CPT

## 2024-02-19 PROCEDURE — 84439 ASSAY OF FREE THYROXINE: CPT

## 2024-02-19 PROCEDURE — 85025 COMPLETE CBC W/AUTO DIFF WBC: CPT

## 2024-02-19 PROCEDURE — 84481 FREE ASSAY (FT-3): CPT

## 2024-02-19 PROCEDURE — 36415 COLL VENOUS BLD VENIPUNCTURE: CPT

## 2024-02-19 PROCEDURE — 81003 URINALYSIS AUTO W/O SCOPE: CPT

## 2024-02-19 PROCEDURE — 84153 ASSAY OF PSA TOTAL: CPT

## 2024-02-19 PROCEDURE — 82607 VITAMIN B-12: CPT

## 2024-04-23 ENCOUNTER — LAB VISIT (OUTPATIENT)
Dept: LAB | Facility: HOSPITAL | Age: 69
End: 2024-04-23
Attending: FAMILY MEDICINE
Payer: MEDICARE

## 2024-04-23 DIAGNOSIS — E11.65 TYPE 2 DIABETES MELLITUS WITH HYPERGLYCEMIA, WITHOUT LONG-TERM CURRENT USE OF INSULIN: Chronic | ICD-10-CM

## 2024-04-23 LAB
ALBUMIN SERPL-MCNC: 4 G/DL (ref 3.4–4.8)
ALBUMIN/GLOB SERPL: 1.2 RATIO (ref 1.1–2)
ALP SERPL-CCNC: 128 UNIT/L (ref 40–150)
ALT SERPL-CCNC: 23 UNIT/L (ref 0–55)
AST SERPL-CCNC: 21 UNIT/L (ref 5–34)
BILIRUB SERPL-MCNC: 1.2 MG/DL
BUN SERPL-MCNC: 17.9 MG/DL (ref 8.4–25.7)
CALCIUM SERPL-MCNC: 9.2 MG/DL (ref 8.8–10)
CHLORIDE SERPL-SCNC: 104 MMOL/L (ref 98–107)
CO2 SERPL-SCNC: 23 MMOL/L (ref 23–31)
CREAT SERPL-MCNC: 1.27 MG/DL (ref 0.73–1.18)
EST. AVERAGE GLUCOSE BLD GHB EST-MCNC: 125.5 MG/DL
GFR SERPLBLD CREATININE-BSD FMLA CKD-EPI: >60 MLS/MIN/1.73/M2
GLOBULIN SER-MCNC: 3.3 GM/DL (ref 2.4–3.5)
GLUCOSE SERPL-MCNC: 176 MG/DL (ref 82–115)
HBA1C MFR BLD: 6 %
POTASSIUM SERPL-SCNC: 4 MMOL/L (ref 3.5–5.1)
PROT SERPL-MCNC: 7.3 GM/DL (ref 5.8–7.6)
SODIUM SERPL-SCNC: 140 MMOL/L (ref 136–145)

## 2024-04-23 PROCEDURE — 83036 HEMOGLOBIN GLYCOSYLATED A1C: CPT

## 2024-04-23 PROCEDURE — 80053 COMPREHEN METABOLIC PANEL: CPT

## 2024-04-23 PROCEDURE — 36415 COLL VENOUS BLD VENIPUNCTURE: CPT

## 2024-07-15 PROBLEM — Z85.038 HISTORY OF COLON CANCER, STAGE I: Status: ACTIVE | Noted: 2022-08-10

## 2024-07-15 PROBLEM — C18.9 COLON CANCER: Chronic | Status: RESOLVED | Noted: 2022-11-15 | Resolved: 2024-07-15

## 2024-09-18 ENCOUNTER — HOSPITAL ENCOUNTER (OUTPATIENT)
Dept: RADIOLOGY | Facility: HOSPITAL | Age: 69
Discharge: HOME | End: 2024-09-18
Attending: FAMILY MEDICINE
Payer: MEDICARE

## 2024-09-18 DIAGNOSIS — M25.562 CHRONIC PAIN OF BOTH KNEES: ICD-10-CM

## 2024-09-18 DIAGNOSIS — M25.561 CHRONIC PAIN OF BOTH KNEES: ICD-10-CM

## 2024-09-18 DIAGNOSIS — G89.29 CHRONIC PAIN OF BOTH KNEES: ICD-10-CM

## 2024-09-18 PROCEDURE — 73564 X-RAY EXAM KNEE 4 OR MORE: CPT | Mod: TC,50

## 2024-11-04 ENCOUNTER — LAB VISIT (OUTPATIENT)
Dept: LAB | Facility: HOSPITAL | Age: 69
End: 2024-11-04
Attending: FAMILY MEDICINE
Payer: MEDICARE

## 2024-11-04 DIAGNOSIS — E11.65 TYPE 2 DIABETES MELLITUS WITH HYPERGLYCEMIA, WITHOUT LONG-TERM CURRENT USE OF INSULIN: ICD-10-CM

## 2024-11-04 DIAGNOSIS — M54.9 DORSALGIA, UNSPECIFIED: ICD-10-CM

## 2024-11-04 LAB
BILIRUB UR QL STRIP.AUTO: NEGATIVE
CAOX CRY UR QL COMP ASSIST: ABNORMAL
CLARITY UR: CLEAR
COLOR UR AUTO: ABNORMAL
CREAT SERPL-MCNC: 1.3 MG/DL (ref 0.72–1.25)
CREAT UR-MCNC: 81.5 MG/DL (ref 63–166)
GFR SERPLBLD CREATININE-BSD FMLA CKD-EPI: 59 ML/MIN/1.73/M2
GLUCOSE UR QL STRIP: NEGATIVE
HGB UR QL STRIP: NEGATIVE
KETONES UR QL STRIP: NEGATIVE
LEUKOCYTE ESTERASE UR QL STRIP: NEGATIVE
MICROALBUMIN UR-MCNC: 96 UG/ML
MICROALBUMIN/CREAT RATIO PNL UR: 117.8 MG/GM CR (ref 0–30)
NITRITE UR QL STRIP: NEGATIVE
PH UR STRIP: 6.5 [PH]
PROT UR QL STRIP: ABNORMAL
RBC #/AREA URNS AUTO: ABNORMAL /HPF
SP GR UR STRIP.AUTO: 1.02 (ref 1–1.03)
SQUAMOUS #/AREA URNS AUTO: ABNORMAL /HPF
UROBILINOGEN UR STRIP-ACNC: 0.2
WBC #/AREA URNS AUTO: ABNORMAL /HPF

## 2024-11-04 PROCEDURE — 36415 COLL VENOUS BLD VENIPUNCTURE: CPT

## 2024-11-04 PROCEDURE — 82570 ASSAY OF URINE CREATININE: CPT

## 2024-11-04 PROCEDURE — 81003 URINALYSIS AUTO W/O SCOPE: CPT

## 2024-11-04 PROCEDURE — 82565 ASSAY OF CREATININE: CPT

## (undated) DEVICE — KIT SURGICAL TURNOVER

## (undated) DEVICE — ELECTRODE PATIENT RETURN DISP

## (undated) DEVICE — KIT BASIC ORTHO UNIVERSITY

## (undated) DEVICE — GLOVE PROTEXIS BLUE LATEX 8.5

## (undated) DEVICE — SLING ARM LARGE FOAM STRAP

## (undated) DEVICE — SUT BLK MONO 3-0 CUT 18IN F

## (undated) DEVICE — BANDAGE VELCLOSE ELAS 3INX5YD

## (undated) DEVICE — GLOVE 6.5 PROTEXIS PI MICRO

## (undated) DEVICE — DRESSING XEROFORM NONADH 1X8IN

## (undated) DEVICE — GOWN POLY REINF X-LONG 2XL

## (undated) DEVICE — GLOVE 8.0 PROTEXIS PI MICRO

## (undated) DEVICE — CUFF CONTOUR DPSB STRL 18IN

## (undated) DEVICE — APPLICATOR CHLORAPREP ORN 26ML

## (undated) DEVICE — GLOVE PROTEXIS BLUE LATEX 6.5

## (undated) DEVICE — UNDERPAD ULTRASORB 300LB 30X36

## (undated) DEVICE — DRAPE HAND STERILE

## (undated) DEVICE — MANIFOLD 4 PORT

## (undated) DEVICE — DRAPE STERI U-SHAPED 47X51IN

## (undated) DEVICE — PADDING CAST SYNTHETIC 4X4IN

## (undated) DEVICE — SOL NACL IRR 1000ML BTL